# Patient Record
Sex: FEMALE | Race: BLACK OR AFRICAN AMERICAN | Employment: OTHER | URBAN - METROPOLITAN AREA
[De-identification: names, ages, dates, MRNs, and addresses within clinical notes are randomized per-mention and may not be internally consistent; named-entity substitution may affect disease eponyms.]

---

## 2018-11-15 ENCOUNTER — DOCUMENTATION ONLY (OUTPATIENT)
Dept: PULMONOLOGY | Age: 68
End: 2018-11-15

## 2018-11-15 NOTE — PROGRESS NOTES
PATIENT:       [facility] OF BIRTH:       1950  DATE:       11/14/2018 09:45 AM  VISIT TYPE:       Office Visit      Assessment/Plan  # Detail Type Description    1. Assessment Sleep apnea, unspecified (G47.30). Impression -Strong suspicion for sleep apnea as outlined above  -CO2 is 32 if positive home sleep study will benefit from dedicated CPAP/BiPAP titration as strong positivity of obesity hypoventilation syndrome    11/14.18: -came for a home sleep study follow-up still feels sleepy and tired  · Mild obstructive sleep apnea syndrome. · AHI of 7  · Lowest oxygen 69%, time below 88 for 1 hour and 10 minutes  · Overall good flow evaluation    RECOMMENDATIONS   Due to symptoms of sleep apnea, treatment is recommended. Patient has mild sleep apnea however flow evaluation was limited and degree of sleep apnea as underestimated. Patient also has severe hypoxemia. Patient will benefit from auto CPAP due to severe hypoxemia, other treatment options like dental appliance and weight loss can be considered also.  -discussed with the patient at length, patient is not sure whether she is willing to go for any CPAP treatment or other treatment at this point  -She would like to think over things and would like to come back  -She would like to come back after holidays we will schedule a 4 month follow-up  -Plan of care discussed at length. Patient Plan Obstructive Sleep Apnea (LIVIA). a.  The consequences of sleep apnea; cardiovascular disease (increase risk of stroke, heart disease, hypertension), cognitive difficulties (attention, memory, concentration difficulties), endocrine difficulties (insulin resistance and possible increase risk of diabetes) and vegetative symptoms (sleepiness, fatigue) were discussed with the patient. b.  The patient was warned that sleep apnea is associated with increase risk of motor vehicle accidents due to daytime sleepiness.      c.  The pathophysiology of sleep apnea and different treatment modalities (CPAP, surgery, dental appliance, weight reduction, positional therapy, medications) were discussed. 2. Assessment Essential (primary) hypertension (I10). Impression -Continue management per PCP  -Optimal treatment of LIVIA will help with better control. 3. Assessment Type 2 diabetes mellitus with hyperglycemia (E11.65). Impression -hemoglobin A1c 7.9 which might be continued into daytime sleepiness and fatigue  -Optimal management to PCP. 4. Assessment Advanced sleep phase syndrome (G47.22). Impression -Usually goes to bed around 8 weeks up at 230 in the morning  -Does not affect her day to day routine  -Does not wish to delay time. Assessment  -Evaluation for sleep apnea    Plan  0150Mild sleep apnea would like to think over treatment options and compatible follow-up    I reviewed patient's medication and adjusted the medication. All the questions related to patient's management plan discussed and answered to patient's satisfaction. I spent     minutes of my time, more than 50% of time spent on face-to-face evaluation, for diagnosis pathology and discussion of various treatment options patient understood the plan all questions were on start. Voice-recognition software may have been used to generate this report, which may have resulted in some phonetic-based errors in grammar and contents. Even though attempts were made to correct all the mistakes, some may have been missed, and remained in the body of the document. This 69year old female presents for HPI and Sleep Apnea (consult). History of Present Illness:  1.   HPI   10/15/2018:  -PCP Dr. Joseph Heredia  -Consult requested for sleep apnea evaluation/snoring  -Patient's primary complaint is snoring, feeling excessively fatigued and tired  -Usually goes to bed around 8 and wakes up around 233 o'clock, as per the patient she always wakes up early as she was working in starting at 5 a.m. in the past  -Does wake up a few times for no reason but overall she sleeps good  -Denies any history of COPD heart failure  -Denies any symptoms of parasomnia restless leg syndrome or narcolepsy  -Does have history of diabetes complicated by bilateral amputation  -Does go to low on [place] sleepiness score 2 out of 24 per does feel sleepy and tired throughout the day    Investigation: CO2 is 32, hemoglobin A1c 7.9    11/14.18: -came for a home sleep study follow-up still feels sleepy and tiredESS is anywhere from 9-11  · Mild obstructive sleep apnea syndrome.   · AHI of 7  · Lowest oxygen 69%, time below 88 for 1 hour and 10 minutes  · Overall good flow evaluation

## 2019-10-27 ENCOUNTER — HOSPITAL ENCOUNTER (OUTPATIENT)
Age: 69
LOS: 1 days | Discharge: HOME | End: 2019-10-29
Attending: INTERNAL MEDICINE | Admitting: INTERNAL MEDICINE
Payer: MEDICARE

## 2019-10-27 PROBLEM — D50.9 IRON DEFICIENCY ANEMIA: Status: ACTIVE | Noted: 2019-10-27

## 2019-10-27 LAB
ALBUMIN SERPL-MCNC: 2.9 G/DL (ref 3.4–5)
ALBUMIN/GLOB SERPL: 0.6 {RATIO} (ref 0.8–1.7)
ALP SERPL-CCNC: 101 U/L (ref 45–117)
ALT SERPL-CCNC: 35 U/L (ref 13–56)
ANION GAP SERPL CALC-SCNC: 5 MMOL/L (ref 3–18)
AST SERPL-CCNC: 24 U/L (ref 10–38)
BILIRUB SERPL-MCNC: 0.3 MG/DL (ref 0.2–1)
BUN SERPL-MCNC: 14 MG/DL (ref 7–18)
BUN/CREAT SERPL: 10 (ref 12–20)
CALCIUM SERPL-MCNC: 8.4 MG/DL (ref 8.5–10.1)
CHLORIDE SERPL-SCNC: 104 MMOL/L (ref 100–111)
CO2 SERPL-SCNC: 30 MMOL/L (ref 21–32)
CREAT SERPL-MCNC: 1.47 MG/DL (ref 0.6–1.3)
ERYTHROCYTE [DISTWIDTH] IN BLOOD BY AUTOMATED COUNT: 18.1 % (ref 11.6–14.5)
EST. AVERAGE GLUCOSE BLD GHB EST-MCNC: 212 MG/DL
GLOBULIN SER CALC-MCNC: 4.6 G/DL (ref 2–4)
GLUCOSE BLD STRIP.AUTO-MCNC: 102 MG/DL (ref 70–110)
GLUCOSE BLD STRIP.AUTO-MCNC: 169 MG/DL (ref 70–110)
GLUCOSE BLD STRIP.AUTO-MCNC: 201 MG/DL (ref 70–110)
GLUCOSE SERPL-MCNC: 262 MG/DL (ref 74–99)
HBA1C MFR BLD: 9 % (ref 4.2–5.6)
HCT VFR BLD AUTO: 37.6 % (ref 35–45)
HGB BLD-MCNC: 10.9 G/DL (ref 12–16)
MCH RBC QN AUTO: 22.4 PG (ref 24–34)
MCHC RBC AUTO-ENTMCNC: 29 G/DL (ref 31–37)
MCV RBC AUTO: 77.4 FL (ref 74–97)
PLATELET # BLD AUTO: 228 K/UL (ref 135–420)
PMV BLD AUTO: 10 FL (ref 9.2–11.8)
POTASSIUM SERPL-SCNC: 3.8 MMOL/L (ref 3.5–5.5)
PROT SERPL-MCNC: 7.5 G/DL (ref 6.4–8.2)
RBC # BLD AUTO: 4.86 M/UL (ref 4.2–5.3)
SODIUM SERPL-SCNC: 139 MMOL/L (ref 136–145)
WBC # BLD AUTO: 6.8 K/UL (ref 4.6–13.2)

## 2019-10-27 PROCEDURE — 36415 COLL VENOUS BLD VENIPUNCTURE: CPT

## 2019-10-27 PROCEDURE — 83540 ASSAY OF IRON: CPT

## 2019-10-27 PROCEDURE — 74011636637 HC RX REV CODE- 636/637: Performed by: INTERNAL MEDICINE

## 2019-10-27 PROCEDURE — 74011250637 HC RX REV CODE- 250/637: Performed by: INTERNAL MEDICINE

## 2019-10-27 PROCEDURE — 86677 HELICOBACTER PYLORI ANTIBODY: CPT

## 2019-10-27 PROCEDURE — 82962 GLUCOSE BLOOD TEST: CPT

## 2019-10-27 PROCEDURE — 83036 HEMOGLOBIN GLYCOSYLATED A1C: CPT

## 2019-10-27 PROCEDURE — 80053 COMPREHEN METABOLIC PANEL: CPT

## 2019-10-27 PROCEDURE — 85027 COMPLETE CBC AUTOMATED: CPT

## 2019-10-27 RX ORDER — ATROPINE SULFATE 0.1 MG/ML
0.5 INJECTION INTRAVENOUS
Status: CANCELLED | OUTPATIENT
Start: 2019-10-27 | End: 2019-10-28

## 2019-10-27 RX ORDER — MIDAZOLAM HYDROCHLORIDE 1 MG/ML
.25-5 INJECTION, SOLUTION INTRAMUSCULAR; INTRAVENOUS
Status: CANCELLED | OUTPATIENT
Start: 2019-10-27 | End: 2019-10-27

## 2019-10-27 RX ORDER — DULOXETIN HYDROCHLORIDE 60 MG/1
60 CAPSULE, DELAYED RELEASE ORAL DAILY
Status: DISCONTINUED | OUTPATIENT
Start: 2019-10-28 | End: 2019-10-29 | Stop reason: HOSPADM

## 2019-10-27 RX ORDER — SODIUM CHLORIDE 0.9 % (FLUSH) 0.9 %
5-40 SYRINGE (ML) INJECTION EVERY 8 HOURS
Status: CANCELLED | OUTPATIENT
Start: 2019-10-27

## 2019-10-27 RX ORDER — EPINEPHRINE 0.1 MG/ML
1 INJECTION INTRACARDIAC; INTRAVENOUS
Status: CANCELLED | OUTPATIENT
Start: 2019-10-27 | End: 2019-10-28

## 2019-10-27 RX ORDER — METOCLOPRAMIDE HYDROCHLORIDE 5 MG/ML
5 INJECTION INTRAMUSCULAR; INTRAVENOUS
Status: DISCONTINUED | OUTPATIENT
Start: 2019-10-27 | End: 2019-10-29 | Stop reason: HOSPADM

## 2019-10-27 RX ORDER — INSULIN LISPRO 100 [IU]/ML
INJECTION, SOLUTION INTRAVENOUS; SUBCUTANEOUS
Status: DISCONTINUED | OUTPATIENT
Start: 2019-10-27 | End: 2019-10-29 | Stop reason: HOSPADM

## 2019-10-27 RX ORDER — FLUMAZENIL 0.1 MG/ML
0.2 INJECTION INTRAVENOUS
Status: CANCELLED | OUTPATIENT
Start: 2019-10-27 | End: 2019-10-27

## 2019-10-27 RX ORDER — GABAPENTIN 400 MG/1
400 CAPSULE ORAL 3 TIMES DAILY
Status: DISCONTINUED | OUTPATIENT
Start: 2019-10-27 | End: 2019-10-29 | Stop reason: HOSPADM

## 2019-10-27 RX ORDER — SODIUM CHLORIDE 0.9 % (FLUSH) 0.9 %
5-40 SYRINGE (ML) INJECTION AS NEEDED
Status: CANCELLED | OUTPATIENT
Start: 2019-10-27

## 2019-10-27 RX ORDER — OLMESARTAN MEDOXOMIL 20 MG/1
40 TABLET ORAL DAILY
Status: DISCONTINUED | OUTPATIENT
Start: 2019-10-28 | End: 2019-10-29 | Stop reason: HOSPADM

## 2019-10-27 RX ORDER — AMLODIPINE BESYLATE 5 MG/1
10 TABLET ORAL DAILY
Status: DISCONTINUED | OUTPATIENT
Start: 2019-10-28 | End: 2019-10-29 | Stop reason: HOSPADM

## 2019-10-27 RX ORDER — SODIUM CHLORIDE 9 MG/ML
1000 INJECTION, SOLUTION INTRAVENOUS CONTINUOUS
Status: CANCELLED | OUTPATIENT
Start: 2019-10-27 | End: 2019-10-27

## 2019-10-27 RX ORDER — DIPHENHYDRAMINE HYDROCHLORIDE 50 MG/ML
50 INJECTION, SOLUTION INTRAMUSCULAR; INTRAVENOUS ONCE
Status: CANCELLED | OUTPATIENT
Start: 2019-10-27 | End: 2019-10-27

## 2019-10-27 RX ORDER — FENTANYL CITRATE 50 UG/ML
100 INJECTION, SOLUTION INTRAMUSCULAR; INTRAVENOUS
Status: CANCELLED | OUTPATIENT
Start: 2019-10-27 | End: 2019-10-27

## 2019-10-27 RX ORDER — NALOXONE HYDROCHLORIDE 0.4 MG/ML
0.4 INJECTION, SOLUTION INTRAMUSCULAR; INTRAVENOUS; SUBCUTANEOUS
Status: CANCELLED | OUTPATIENT
Start: 2019-10-27 | End: 2019-10-27

## 2019-10-27 RX ORDER — OLMESARTAN MEDOXOMIL 40 MG/1
40 TABLET ORAL DAILY
COMMUNITY

## 2019-10-27 RX ORDER — CARVEDILOL 12.5 MG/1
12.5 TABLET ORAL 2 TIMES DAILY WITH MEALS
Status: DISCONTINUED | OUTPATIENT
Start: 2019-10-27 | End: 2019-10-29 | Stop reason: HOSPADM

## 2019-10-27 RX ORDER — DEXTROMETHORPHAN/PSEUDOEPHED 2.5-7.5/.8
1.2 DROPS ORAL
Status: CANCELLED | OUTPATIENT
Start: 2019-10-27

## 2019-10-27 RX ADMIN — INSULIN LISPRO 2 UNITS: 100 INJECTION, SOLUTION INTRAVENOUS; SUBCUTANEOUS at 17:13

## 2019-10-27 RX ADMIN — GABAPENTIN 400 MG: 400 CAPSULE ORAL at 23:02

## 2019-10-27 RX ADMIN — GABAPENTIN 400 MG: 400 CAPSULE ORAL at 14:18

## 2019-10-27 RX ADMIN — CARVEDILOL 12.5 MG: 12.5 TABLET, FILM COATED ORAL at 17:13

## 2019-10-27 RX ADMIN — INSULIN LISPRO 4 UNITS: 100 INJECTION, SOLUTION INTRAVENOUS; SUBCUTANEOUS at 22:02

## 2019-10-27 NOTE — H&P
This 77year old female presents for Anemia. History of Present Illness:  1. 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     Carpal tunnel syndrome 2010 Y     Epicondylitis, lateral, right  N       PROBLEM LIST (not yet mapped to SNOMED-CT®):  Problem Description Onset Date Notes   AMPUTATION OF LOWER LIMB 2012    Traumatic amputation of leg(s) (complete) (partial), unilateral, below knee, without mention of complication               PAST MEDICAL/SURGICAL HISTORY   (Detailed)    Disease/disorder Onset Date Management Date Comments   Open angle glaucoma  Begin medication     Peripheral vascular disease 2010 BKA L and R     Diabetes mellitus, type 2 2010      High cholesterol       Hypertension         Cholecystectomy           Family History  (Detailed)  Relationship Family Member Name  Age at Death Condition Onset Age Cause of Death     N    N   Father  N  cancer 70 N         Social History:  (Detailed)  Tobacco use reviewed. The patient is right-handed. Preferred language is [place]. The patient does not need an . MARITAL STATUS/FAMILY/SOCIAL SUPPORT  Currently single. Tobacco use status: Ex-cigarette smoker. Smoking status: Former smoker.     SMOKING STATUS  Use Status Type Smoking Status Usage Per Day Years Used Total Pack Years   yes Cigarette Former smoker        CESSATION  Type Date Quit Longest Tobacco Free Cessation Method   Cigarette 1998       ALCOHOL  There is no history of alcohol use. CAFFEINE  The patient does not use caffeine. LIFESTYLE  Sedentary activity level. DIET  diabetic. Medications (active prior to today)  Medication Instructions Start Date Stop Date Refilled Elsewhere   tolterodine ER 4 mg capsule,extended release 24 hr take 1 capsule by oral route  every day 05/04/2018 05/04/2018 N   Norvasc 10 mg tablet TAKE ONE TABLET BY MOUTH ONCE DAILY 06/05/2018 06/05/2018 N   Crestor 20 mg tablet TAKE 1 TABLET DAILY 07/19/2018 07/19/2018 N   CARVEDILOL 12.5MG   TAB TAKE 1 TABLET BY MOUTH TWICE DAILY WITH  FOOD 10/18/2018  10/18/2018 N   GABAPENTIN 400MG    CAP TAKE ONE CAPSULE BY MOUTH THREE TIMES DAILY 10/31/2018  10/31/2018 N   Benicar 40 mg tablet take 1 tablet by oral route  every day 03/18/2019   N   Cymbalta 60 mg capsule,delayed release TAKE ONE CAPSULE BY MOUTH ONCE DAILY 04/11/2019 04/11/2019 N   Basaglar KwikPen U-100 Insulin 100 unit/mL (3 mL) subcutaneous inject 30 units by subcutaneous route at bedtime 04/16/2019   N   Novolog Flexpen U-100 Insulin aspart 100 unit/mL subcutaneous inject up to 30 units by subcutaneous route with meals 04/16/2019   N   BD Insulin Syringe Ultra-Fine 0.5 mL 31 gauge x 5/16\" Inject insulin subcutaneouly before each meal and bedtime 05/07/2019 05/07/2019 N   Pen Needle 31 gauge x 5/16\" USE ONE PEN NEEDLE TO CHECK BLOOD SUGAR FOUR TIMES A DAY. 05/07/2019 05/07/2019 N     Patient Status   Completed with information received for patient in a summary of care record. Medication Reconciliation  Medications reconciled today.   Medication Reviewed  Adherence Medication Name Sig Desc Elsewhere Status   taking as directed Cymbalta 60 mg capsule,delayed release TAKE ONE CAPSULE BY MOUTH ONCE DAILY N Verified   taking as directed Basaglar KwikPen U-100 Insulin 100 unit/mL (3 mL) subcutaneous inject 30 units by subcutaneous route at bedtime N Verified   taking as directed Norvasc 10 mg tablet TAKE ONE TABLET BY MOUTH ONCE DAILY N Verified   taking as directed tolterodine ER 4 mg capsule,extended release 24 hr take 1 capsule by oral route  every day N Verified   taking as directed CARVEDILOL 12.5MG   TAB TAKE 1 TABLET BY MOUTH TWICE DAILY WITH  FOOD N Verified   taking as directed Novolog Flexpen U-100 Insulin aspart 100 unit/mL subcutaneous inject up to 30 units by subcutaneous route with meals N Verified   taking as directed Crestor 20 mg tablet TAKE 1 TABLET DAILY N Verified   taking as directed GABAPENTIN 400MG    CAP TAKE ONE CAPSULE BY MOUTH THREE TIMES DAILY N Verified   taking as directed BD Insulin Syringe Ultra-Fine 0.5 mL 31 gauge x 5/16\" Inject insulin subcutaneouly before each meal and bedtime N Verified   taking as directed Pen Needle 31 gauge x 5/16\" USE ONE PEN NEEDLE TO CHECK BLOOD SUGAR FOUR TIMES A DAY.  N Verified   taking as directed Benicar 40 mg tablet take 1 tablet by oral route  every day N Verified     Medications (Added, Continued or Stopped today)  Start Date Medication Directions PRN Status PRN Reason Instruction Stop Date   04/16/2019 Basaglar KwikPen U-100 Insulin 100 unit/mL (3 mL) subcutaneous inject 30 units by subcutaneous route at bedtime N  E11.65    05/07/2019 BD Insulin Syringe Ultra-Fine 0.5 mL 31 gauge x 5/16\" Inject insulin subcutaneouly before each meal and bedtime N  E11.65    03/18/2019 Benicar 40 mg tablet take 1 tablet by oral route  every day N      10/18/2018 CARVEDILOL 12.5MG   TAB TAKE 1 TABLET BY MOUTH TWICE DAILY WITH  FOOD N      07/19/2018 Crestor 20 mg tablet TAKE 1 TABLET DAILY N      04/11/2019 Cymbalta 60 mg capsule,delayed release TAKE ONE CAPSULE BY MOUTH ONCE DAILY N      10/31/2018 GABAPENTIN 400MG    CAP TAKE ONE CAPSULE BY MOUTH THREE TIMES DAILY N      06/04/2019 GaviLyte-N 420 gram oral solution take as prescribed by physician N      06/04/2019 Iron (ferrous sulfate) 325 mg (65 mg iron) tablet take 1 tablet by oral route  every day N      06/05/2018 Norvasc 10 mg tablet TAKE ONE TABLET BY MOUTH ONCE DAILY N      04/16/2019 Novolog Flexpen U-100 Insulin aspart 100 unit/mL subcutaneous inject up to 30 units by subcutaneous route with meals N  E11.65    05/07/2019 Pen Needle 31 gauge x 5/16\" USE ONE PEN NEEDLE TO CHECK BLOOD SUGAR FOUR TIMES A DAY. N  E11.65   E11.9    05/04/2018 tolterodine ER 4 mg capsule,extended release 24 hr take 1 capsule by oral route  every day N        Allergies:  Ingredient Reaction (Severity) Medication Name Comment   NO KNOWN ALLERGIES          Review of Systems  System Neg/Pos Details   Constitutional Negative Chills, Fever, Malaise and Weight loss. ENMT Negative Nasal congestion and Sore throat. Eyes Negative Double vision. Respiratory Negative Asthma, Chronic cough and Wheezing. Cardio Negative Chest pain, Edema and Irregular heartbeat/palpitations. GI Negative Abdominal pain, Change in bowel habits, Constipation, Decreased appetite, Diarrhea, Dysphagia, Heartburn, Hematemesis, Hematochezia, Melena, Nausea, Reflux and Vomiting.  Negative Dysuria and Hematuria. Endocrine Negative Cold intolerance, Heat intolerance and Increased thirst.   Neuro Negative Dizziness, Headache, Numbness, Tremors and Vertigo. Psych Negative Anxiety, Depression and Increased stress. Integumentary Negative Hives and Rash. MS Negative Back pain, Joint pain and Myalgia. Hema/Lymph Negative Easy bleeding and Easy bruising. Allergic/Immuno Negative Food allergies and Seasonal allergies.    Vital Signs     Date/Time Temp Pulse BP MAP (Calculated) Arterial Line 1 BP (mmHg) BP Patient Position Resp SpO2 O2 Device O2 Flow Rate (L/min) Pre/Post Ductal Weight   10/27/19 1201 98.5 °F (36.9 °C) 82 141/55 84  At rest;Sitting 18 98 %       10/27/19 0940         Room air      10/27/19 0929 98.6 °F (37 °C) 97 153/73 100  At rest;Sitting 16 96 %           PHYSICAL EXAM:  Exam Findings Details   Constitutional Normal obese   Eyes Normal Conjunctiva - Right: Normal, Left: Normal. Sclera - Right: Normal, Left: Normal.   Nasopharynx Normal Lips/teeth/gums - Normal. Buccal mucosa - Normal. Many missing upper and back lower teeth   Neck Exam Normal Inspection - Normal. Palpation - Normal. Thyroid gland - Normal.   Respiratory Normal Inspection - Normal. Auscultation - Normal.   Cardiovascular Normal Regular rate and rhythm. No murmurs, gallops, or rubs. Vascular Normal    Abdomen Normal Inspection - obese protuberant  Appliance(s) with a medium sized umbilical hernia and a long oblique RUQ scar- Normal. Abdominal muscles - Normal. Auscultation - Normal. Percussion - Normal. Anterior palpation - Normal, No guarding. Umbilicus - Normal. No abdominal tenderness. No hepatic enlargement. No spleen enlargement. No hernia. No Ascites. Parikh's sign - Negative. No hepatic tenderness. No hepatic bruit. Skin Normal Inspection - Normal.   Musculoskeletal Comments Pt in a wheelchair, bilateral BKA. Musculoskeletal Normal Visual overview of all four extremities is normal. Gait - Normal. Cervical spine - Normal Inspection and Normal Range of Motion. Thoracic spine - Normal Inspection and Normal Range of Motion. Lumbar spine - Normal Inspection and Normal Range of Motion. Shoulder -Left: Normal Inspection and Normal Range of Motion, Right: Normal Inspection and Normal Range of Motion. Hands - Left: Normal Inspection and Normal Range of Motion, Right: Normal Inspection and Normal Range of Motion. Hands/Wrist - Right: Normal, Left: Normal. Knee - Left: Normal Inspection and Normal Range of Motion, Right: Normal Inspection and Normal Range of Motion. Extremity Normal No edema. She has bilateral below the knee amputation   Neurological Normal Fine motor skills - Normal. Moves all her extremities   Psychiatric Normal Orientation - Oriented to time, place, person & situation. Appropriate mood and affect. Assessment/Plan  # Detail Type Description    1. Assessment Anemia, unspecified (D64.9). Patient Plan 77year old female pt of Dr. Veronica Linn referred for very mild chronic iron deficiency anemia at 11.5 to 12 but with low iron present since at least 2010. No evidence of blood in the stool or dark stools. . No nausea, vomiting, SOB, abdominal pain, or chest pain. Pt reports some chills occasionally. H&H is 11.5 and 35.7 MCV 78. Iron is decreased at 48 and Iron sat is 13%. Pt denies any  dysphagia, early satiety, regurgitation, or GERD symptoms. No NSAID's. No prior hx of ulcer. She received blood transfusion during her leg amputation in 2011    PLAN: Oral iron supplement daily. EGD scheduled. Repeat CBC  and iron profile after EGD. Pt advised to drink prior to eating and in between bites of food, chew foods to soft consistency. Avoid NSAID's, tobacco and ETOH consumption. I explained the procedure of upper endoscopy or EGD, the alternative and the risks involved which include but not limited to aspiration, bleeding perforation or reaction to sedation. He was agreeable to this. 2. Assessment Encounter for screening colonoscopy (Z12.11). Patient Plan 77year old female  patient of Dr. Veronica Linn for colonoscopy. Last colonoscopy done on 06/18/2010  by my self medium sized hemorrhoids, slightly tender colon, and internal hemorrhoids. I don't think she ever had an EGD. Has on Upper or lower GI symptoms. She has one BM once daily. Normal color, soft, formed in consistency. No evidence of blood or mucus, changes in bowel pattern or constipation issues. Patient reports no allergies or herbal consumption. Medical hx includes HTN, DMII on Insuline, PVDNo significant cardiac, pulmonary, GI, , musculoskeletal, or endocrine issues. Surgical hx remarkable for cholecystectomy and bilateral below the knee amputation in 2010 and 2011. No family history of colorectal CA. Denies tobacco and ETOH use. No significant weight gains or losses in the last 3-6 months.  No heat or cold intolerances. Patient states  no N/V/D, fever, chills, sick contacts, SOB, abdominal or chest pains. No dysphagia, appetite is good which consists of 3 meals per day. PLAN: Colonoscopy Scheduled     She has average risk for colon cancer and is asymptomatic. She would be having her screening colonoscopy. I explained to her the procedure of colonoscopy and the risks involved which include but not limited to reaction to sedation, bleeding, perforation, infection or missing a lesion if bowels are not well prepped or are unusually tortuous. She agreed to proceed with the procedure and answered her questions. I gave her the Neil Copping to clean her bowels. I advised her to take if needed extra laxatives for few days before in case she is on the constipated side to assure adequate bowel prep. Told her not take her medications in the morning of the procedure because they would be flushed out with the prep but can take them more confidently after the procedure. I advised her to bring all her medication with her. I advised pt to not to take hypoglycemic agents/ Insuline the morning of the procedure to avoid hypoglycemic reaction because fasting is needed and check BS at least twice prior to procedure. Mild chronic iron deficiency anemia. Colonoscopy was negative in 6/ 2010 so for now we are going to do an EGD and colonoscopy at the same time because she has bilateral blow the knee amputation, lives at home with family and needs the hospital setting to do her bowel prep.  She has a large umbilical hernia

## 2019-10-27 NOTE — PROGRESS NOTES
0187 patient arrived on floor, direct admission. Patient alert and oriented x 4. She has bilateral BKA, able to transfer from her wheelchair to bed without assistance. Skin intact, pain rated 0/10. Missing some of her lower teeth, upper dentures left at home. Scar on back and L thigh. Skin otherwise intact. No complaint of pain/nausea. 36 Dr Abdias Villasenor was called, stated patient will be on clear liquid, resume all home meds, blood sugar checks ACHS, colonoscopy tomorrow unknown time, start half of her bowel prep now, the rest tomorrow morning. Patient is to use her own prep she brought to hospital. She is on clear liquid diet now, and is able to take her medications tonight and tomorrow AM per Dr Abdias Villasenor.     1700 patient started on colonoscopy prep. 1730 Large formed/soft BM. 1830 medium loose/watery BM. Dr Abdias Villasenor in to assess patient. Stated colonoscopy will likely be in the afternoon around [id number]. Night shift RN present and aware that patient will consume half of prep tonight and the rest tomorrow morning. Patient Vitals for the past 12 hrs:   Temp Pulse Resp BP SpO2   10/27/19 1940 97.9 °F (36.6 °C) 81 18 136/59 96 %   10/27/19 1201 98.5 °F (36.9 °C) 82 18 141/55 98 %   10/27/19 0929 98.6 °F (37 °C) 97 16 153/73 96 %     Bedside and Verbal shift change report given to Rafi Link RN (oncoming nurse) by Kacey Arboleda RN (offgoing nurse). Report included the following information SBAR, Kardex, Intake/Output, MAR and Recent Results.

## 2019-10-28 LAB
GLUCOSE BLD STRIP.AUTO-MCNC: 165 MG/DL (ref 70–110)
GLUCOSE BLD STRIP.AUTO-MCNC: 184 MG/DL (ref 70–110)
GLUCOSE BLD STRIP.AUTO-MCNC: 187 MG/DL (ref 70–110)
GLUCOSE BLD STRIP.AUTO-MCNC: 234 MG/DL (ref 70–110)
IRON SATN MFR SERPL: 12 %
IRON SERPL-MCNC: 31 UG/DL (ref 50–175)
TIBC SERPL-MCNC: 268 UG/DL (ref 250–450)

## 2019-10-28 PROCEDURE — 77030020256 HC SOL INJ NACL 0.9%  500ML: Performed by: INTERNAL MEDICINE

## 2019-10-28 PROCEDURE — G0500 MOD SEDAT ENDO SERVICE >5YRS: HCPCS | Performed by: INTERNAL MEDICINE

## 2019-10-28 PROCEDURE — 88305 TISSUE EXAM BY PATHOLOGIST: CPT

## 2019-10-28 PROCEDURE — 77030040361 HC SLV COMPR DVT MDII -B: Performed by: INTERNAL MEDICINE

## 2019-10-28 PROCEDURE — 76040000008: Performed by: INTERNAL MEDICINE

## 2019-10-28 PROCEDURE — 99153 MOD SED SAME PHYS/QHP EA: CPT | Performed by: INTERNAL MEDICINE

## 2019-10-28 PROCEDURE — 82962 GLUCOSE BLOOD TEST: CPT

## 2019-10-28 PROCEDURE — 77030013991 HC SNR POLYP ENDOSC BSC -A: Performed by: INTERNAL MEDICINE

## 2019-10-28 PROCEDURE — 74011636637 HC RX REV CODE- 636/637: Performed by: INTERNAL MEDICINE

## 2019-10-28 PROCEDURE — 77030016816 HC CATH ELECHEMSTAS1 BSC -C: Performed by: INTERNAL MEDICINE

## 2019-10-28 PROCEDURE — 77030010936 HC CLP LIG BSC -C: Performed by: INTERNAL MEDICINE

## 2019-10-28 PROCEDURE — 74011250637 HC RX REV CODE- 250/637: Performed by: INTERNAL MEDICINE

## 2019-10-28 PROCEDURE — 74011250636 HC RX REV CODE- 250/636: Performed by: INTERNAL MEDICINE

## 2019-10-28 RX ORDER — FENTANYL CITRATE 50 UG/ML
100 INJECTION, SOLUTION INTRAMUSCULAR; INTRAVENOUS
Status: DISCONTINUED | OUTPATIENT
Start: 2019-10-28 | End: 2019-10-28 | Stop reason: HOSPADM

## 2019-10-28 RX ORDER — NALOXONE HYDROCHLORIDE 0.4 MG/ML
0.4 INJECTION, SOLUTION INTRAMUSCULAR; INTRAVENOUS; SUBCUTANEOUS
Status: DISCONTINUED | OUTPATIENT
Start: 2019-10-28 | End: 2019-10-28 | Stop reason: HOSPADM

## 2019-10-28 RX ORDER — MIDAZOLAM HYDROCHLORIDE 1 MG/ML
.25-5 INJECTION, SOLUTION INTRAMUSCULAR; INTRAVENOUS
Status: DISCONTINUED | OUTPATIENT
Start: 2019-10-28 | End: 2019-10-28 | Stop reason: HOSPADM

## 2019-10-28 RX ORDER — FLUMAZENIL 0.1 MG/ML
0.2 INJECTION INTRAVENOUS
Status: DISCONTINUED | OUTPATIENT
Start: 2019-10-28 | End: 2019-10-28 | Stop reason: HOSPADM

## 2019-10-28 RX ORDER — SODIUM CHLORIDE 9 MG/ML
1000 INJECTION, SOLUTION INTRAVENOUS CONTINUOUS
Status: DISCONTINUED | OUTPATIENT
Start: 2019-10-28 | End: 2019-10-28 | Stop reason: HOSPADM

## 2019-10-28 RX ORDER — SODIUM CHLORIDE 9 MG/ML
1000 INJECTION, SOLUTION INTRAVENOUS CONTINUOUS
Status: DISPENSED | OUTPATIENT
Start: 2019-10-28 | End: 2019-10-28

## 2019-10-28 RX ORDER — ACETAMINOPHEN 500 MG
500 TABLET ORAL
Status: DISCONTINUED | OUTPATIENT
Start: 2019-10-28 | End: 2019-10-29 | Stop reason: HOSPADM

## 2019-10-28 RX ADMIN — DULOXETINE HYDROCHLORIDE 60 MG: 60 CAPSULE, DELAYED RELEASE ORAL at 08:35

## 2019-10-28 RX ADMIN — GABAPENTIN 400 MG: 400 CAPSULE ORAL at 21:44

## 2019-10-28 RX ADMIN — GABAPENTIN 400 MG: 400 CAPSULE ORAL at 08:35

## 2019-10-28 RX ADMIN — AMLODIPINE BESYLATE 10 MG: 5 TABLET ORAL at 08:34

## 2019-10-28 RX ADMIN — INSULIN LISPRO 2 UNITS: 100 INJECTION, SOLUTION INTRAVENOUS; SUBCUTANEOUS at 11:45

## 2019-10-28 RX ADMIN — INSULIN LISPRO 4 UNITS: 100 INJECTION, SOLUTION INTRAVENOUS; SUBCUTANEOUS at 21:51

## 2019-10-28 RX ADMIN — INSULIN LISPRO 2 UNITS: 100 INJECTION, SOLUTION INTRAVENOUS; SUBCUTANEOUS at 08:35

## 2019-10-28 RX ADMIN — INSULIN LISPRO 2 UNITS: 100 INJECTION, SOLUTION INTRAVENOUS; SUBCUTANEOUS at 17:40

## 2019-10-28 RX ADMIN — OLMESARTAN MEDOXOMIL 40 MG: 20 TABLET, FILM COATED ORAL at 08:35

## 2019-10-28 RX ADMIN — ACETAMINOPHEN 500 MG: 500 TABLET ORAL at 11:45

## 2019-10-28 RX ADMIN — CARVEDILOL 12.5 MG: 12.5 TABLET, FILM COATED ORAL at 08:34

## 2019-10-28 RX ADMIN — GABAPENTIN 400 MG: 400 CAPSULE ORAL at 17:41

## 2019-10-28 RX ADMIN — CARVEDILOL 12.5 MG: 12.5 TABLET, FILM COATED ORAL at 17:41

## 2019-10-28 NOTE — PROGRESS NOTES
SHIFT SUMMARY  Received patient alert and oriented,on room air,liquid diet and currently drinking Golytely in preparation for colonoscopy tomorrow afternoon. assessement completed per doc flow sheet,denies having any pain encouraged to continue drinking bowel prep having multiple loose  Brown stool.  at the bedside to explain the scheduled procedure. No changes noted,pt stable with V below. Patient Vitals for the past 4 hrs:   Temp Pulse Resp BP SpO2   10/27/19 2224 98 °F (36.7 °C) 80 18 116/55 94 %       Bedside and Verbal shift change report given to GRACIA Whiteside RN (oncoming nurse) by Karla Packer RN   (offgoing nurse). Report included the following information SBAR, Kardex and MAR.

## 2019-10-28 NOTE — PROGRESS NOTES
Problem: Falls - Risk of  Goal: *Absence of Falls  Description  Document Annabelle Guzman Fall Risk and appropriate interventions in the flowsheet.   Outcome: Progressing Towards Goal  Note:   Fall Risk Interventions:  Mobility Interventions: Patient to call before getting OOB              Elimination Interventions: Call light in reach

## 2019-10-28 NOTE — ROUTINE PROCESS
Bedside and Verbal shift change report given to Per Cabral RN (oncoming nurse) by Duke Yu RN 
 (offgoing nurse). Report included the following information SBAR, Kardex, Intake/Output, MAR and Recent Results.

## 2019-10-28 NOTE — PROCEDURES
[facility]  Esophagogastroduodenoscopy Procedure Report  _______________________________________________________  Patient: [facility]  Attending Physician: Karen Treadwell MD    Patient ID: [id number]                                     Referring Physician: Dr Nadege Bustillo      Exam Date: 10/28/2019   _______________________________________________________      Introduction: A 77 y.o. Female, presents for an Esophagogastroduodenoscopy Procedure    Indication:   pt of Dr. Nadege Bustillo referred for very mild chronic iron deficiency anemia at 11.5 to 12 but with low iron present since at least 2010. No evidence of blood in the stool or dark stools. . No nausea, vomiting, SOB, abdominal pain, or chest pain. Pt reports some chills occasionally. H&H is 11.5 and 35.7 MCV 78. Iron is decreased at 48 and Iron sat is 13%. Pt denies any  dysphagia, early satiety, regurgitation, or GERD symptoms. No NSAID's. No prior hx of ulcer. She received blood transfusion during her leg amputation in 2011    : Karen Treadwell MD    Sedation:    Versed 5 mg IV, fentanyl 100 mcg IV, glucagon . 5 mg IV    Procedure Details:  After infomed consent was obtained for the procedure, with all risks and benefits of procedure explained the patient was taken to the endoscopy suite and placed in the left lateral decubitus position. Following sequential administration of sedation as per above, the endoscope was inserted into the mouth and advanced under direct vision to fourth portion of the duodenum. A careful inspection was made as the gastroscope was withdrawn, including a retroflexed view of the proximal stomach; findings and interventions are described below. ASA status: III      FINDINGS:  HYPOPHARYNX AND LARYNX: Normal. Spastic criccopharynx  Esophagus: Normal proximal, middle and distal esophagus. Tiny < 1cm Hiatal hernia. Diaphragmatic pinch located at 42 cm. Stomach: No blood, food or liquid retention.  Normal, cardia, fundus, body, lesser curvature, incisura, antrum and pylorus. Mucosa is normal.    Duodenum:   The bulb, third, fourth portions and major papilla are normal. 2 tiny AVM lesions in the second portion of the duodenum one about > 1 cm above the major papilla and second in the distal D2 both cauterized by bipolar probe    Therapies:    2 small AVM  ablation performed with bipolar probe in the mid and distal portion of the duodenum    Specimen:   none           Complications:   None    EBL:  None    IMPRESSION:  Spastic cricco-pharynx. One tiny < 1 cm hiatal hernia. 2 tiny AVM lesions in the second portion of the duodenum one about > 1 cm above the major papilla and second in the distal D2 both cauterized by bipolar probe        RECOMMENDATIONS: -Follow clinical symptoms and laboratory studies for evidence of rebleeding. , -GERD diet: avoid fried and fatty foods.  peppermint, chocolate, alcohol, coffee, citrus fruits and juices, tomoato products; avoid lying down for 2 to 3 hours after eating., -Follow up with me., -No NSAID'S do the colonoscopy    Assistant: none    Obdulia Smith MD  10/28/2019  4:24 PM

## 2019-10-28 NOTE — PROGRESS NOTES
Shift summary: Pt is A/O x4, bilateral BKA, with wheelchair at bedside. Pt had started bowel prep prior to shift change last night for colonoscopy today - several watery stool but still dark to black. Pt still working on remainder of bowel prep this am. BS ACHS. On IVF of NS at 125 ml/hr started early am. Passed on in report that pt is NPO 8 hrs prior to procedure, consent not signed at this time.  Shift uneventful

## 2019-10-28 NOTE — PROCEDURES
[facility]  Colonoscopy Procedure Report  _______________________________________________________  Patient: JANIE GENERAL BIN [facility]                                         Attending Physician: Miroslava Luis MD    Patient ID: [id number]                                      Referring Physician: Dequan Chaves MD    Exam Date: October 28, 2019 _______________________________________________________      Introduction: A  77 y.o. female patient, presents for outpatient Colonoscopy    Indications: 77year old female  patient of Dr. Damion Bernal has hem positive chronic iron deficiency anemia. Last colonoscopy done on 06/18/2010  by my self medium sized hemorrhoids, slightly tender colon, and internal hemorrhoids. Has no Upper or lower GI symptoms. She has one BM once daily. Medical hx includes HTN, DMII on Insuline, PVD. CKD. Surgical hx remarkable for cholecystectomy and bilateral below the knee amputation in 2010 and 2011. No family history of colorectal     Consent: The benefits, risks, and alternatives to the procedure were discussed and informed consent was obtained from the patient. Preparation: EKG, pulse, pulse oximetry and blood pressure were monitored throughout the procedure. ASA Classification: Class 3 - . The heart is an S1-S2 and regular heart rate and rhythm. Lungs are clear to auscultation and percussion. Abdomen is soft, nondistended, and nontender. Mental Status: awake, alert, and oriented to person, place, and time    Medications:  Previously sedated   · Versed 5 mg IV throughout the procedure. · Glucagon . 5 mg IV     Rectal Exam: Anal skin tags. Normal Rectal Exam. No Blood. Pathology Specimens: one specimen removed. Procedure: The colonoscope was passed with difficulty through the anus under direct visualization and advanced to the cecum. The patient required positioning on the back and external counter pressure to aid in the passage of the scope from the ascending colon to the cecum because of looping. The scope was withdrawn and the mucosa was carefully examined. The quality of the preparation was fair. The views were excellent. The patient's toleration of the procedure was very good. Retroflexion was preformed in the ascending colon and hepatic flexure. Total time is 26 minutes and withdrawal time is 19 minutes. Findings:  Slightly long redundant colon. Rectum:   Normal  Sigmoid:   normal   Descending Colon:   Normal  Transverse Colon:   Normal  Ascending Colon:   6.5 mm sessile polyp in the proximal ascending colon, cold snared but then it bled. Bleeding not controlled with monopolar cautery I had to apply a metallic clip to control it  Cecum:   Hard to reach  Terminal Ileum:   Not entered      Unplanned Events: There were no unplanned events. Estimated Blood Loss: Minimal  Impressions:    Slightly long redundant colon. 6.5 mm sessile polyp in the proximal ascending colon, cold snared but then it bled. Bleeding not controlled with monopolar cautery I had to apply a metallic clip to control it. Normal Mucosa. No diverticula found. Complications: None; patient tolerated the procedure well. Recommendations:  · Discharge home when standard parameters are met. · Resume a high fiber diabetic diet. · Colonoscopy recommendation in 5 years. · Can go home today or tomorrow am  · Monitor Hgb /Hct . Consider capsule enteroscopy.  If repeat occult blood in the stool is positive again and or the Hgb keeps dropping further    Procedure Codes:    · Roya Lowe [[id number]]  · Micah Kimball BLEEDING [[id number]]    Endoscope Information:  Model Number(s)    [id number]   Assistant: None    Signed By: David Walsh MD Date: October 28, 2019

## 2019-10-28 NOTE — PROGRESS NOTES
5396 -  Bedside and verbal shift change report given to Vanessa Diaz RN (on coming nurse) by Rajinder Briceno RN (off going nurse). Report included the following information SBAR, Kardex, OR Summary, Intake/Output and MAR.    1925 -  Bedside and verbal shift change report given by JESSENIA Mejias (off going nurse) to SASCHA Vanessa RN (on coming nurse). Report included the following information SBAR, Kardex, OR Summary, Intake/Output and MAR.

## 2019-10-28 NOTE — PROGRESS NOTES
Reason for Admission:   Planned EGD and colonoscopy                    RRAT Score:    Low; 5                 Plan for utilizing home health:  TBD                        Current Advanced Directive/Advance Care Plan:  Not on file                         Transition of Care Plan:      Physician follow up vs [facility] and physician follow up 10/29/19                Chart reviewed. CM met with pt at bedside to discuss transition of care. Pt's adult daughter lives with pt and will assist as needed. Pt is independent in her wheel chair and plans to return home following following EGD and colonoscopy today. Anticipate pt will transition home with in then next 24 hours with physician follow up. Pt's daughter will transport pt home when medically stable. CM to continue to follow and assist as needed. Care Management Interventions  Mode of Transport at Discharge:  Other (see comment)  Transition of Care Consult (CM Consult): Discharge Planning  Health Maintenance Reviewed: Yes  Current Support Network: Family Lives Nearby, Other(pt's adult daughter lives with pt)  Plan discussed with Pt/Family/Caregiver: Yes  Discharge Location  Discharge Placement: Home with family assistance

## 2019-10-28 NOTE — PROGRESS NOTES
Problem: Falls - Risk of  Goal: *Absence of Falls  Description  Document Ally Read Fall Risk and appropriate interventions in the flowsheet.   10/28/2019 1950 by Solange Berrios RN  Outcome: Progressing Towards Goal  Note:   Fall Risk Interventions:  Mobility Interventions: Patient to call before getting OOB              Elimination Interventions: Call light in reach           10/28/2019 1758 by Solange Berrios RN  Outcome: Progressing Towards Goal  Note:   Fall Risk Interventions:  Mobility Interventions: Patient to call before getting OOB              Elimination Interventions: Call light in reach

## 2019-10-29 VITALS
RESPIRATION RATE: 18 BRPM | SYSTOLIC BLOOD PRESSURE: 118 MMHG | WEIGHT: 255.1 LBS | DIASTOLIC BLOOD PRESSURE: 57 MMHG | HEIGHT: 68 IN | OXYGEN SATURATION: 95 % | BODY MASS INDEX: 38.66 KG/M2 | HEART RATE: 86 BPM | TEMPERATURE: 98.6 F

## 2019-10-29 LAB
GLUCOSE BLD STRIP.AUTO-MCNC: 130 MG/DL (ref 70–110)
GLUCOSE BLD STRIP.AUTO-MCNC: 172 MG/DL (ref 70–110)
H PYLORI IGA SER-ACNC: 11.9 UNITS (ref 0–8.9)
H PYLORI IGG SER IA-ACNC: 0.51 INDEX VALUE (ref 0–0.79)

## 2019-10-29 PROCEDURE — 74011636637 HC RX REV CODE- 636/637: Performed by: INTERNAL MEDICINE

## 2019-10-29 PROCEDURE — 74011250637 HC RX REV CODE- 250/637: Performed by: INTERNAL MEDICINE

## 2019-10-29 PROCEDURE — 82962 GLUCOSE BLOOD TEST: CPT

## 2019-10-29 RX ADMIN — INSULIN LISPRO 2 UNITS: 100 INJECTION, SOLUTION INTRAVENOUS; SUBCUTANEOUS at 12:36

## 2019-10-29 RX ADMIN — OLMESARTAN MEDOXOMIL 40 MG: 20 TABLET, FILM COATED ORAL at 09:50

## 2019-10-29 RX ADMIN — CARVEDILOL 12.5 MG: 12.5 TABLET, FILM COATED ORAL at 09:49

## 2019-10-29 RX ADMIN — DULOXETINE HYDROCHLORIDE 60 MG: 60 CAPSULE, DELAYED RELEASE ORAL at 09:50

## 2019-10-29 RX ADMIN — AMLODIPINE BESYLATE 10 MG: 5 TABLET ORAL at 09:49

## 2019-10-29 RX ADMIN — ACETAMINOPHEN 500 MG: 500 TABLET ORAL at 12:38

## 2019-10-29 RX ADMIN — GABAPENTIN 400 MG: 400 CAPSULE ORAL at 09:50

## 2019-10-29 NOTE — PROGRESS NOTES
Problem: Falls - Risk of  Goal: *Absence of Falls  Description  Document Allyjarret Read Fall Risk and appropriate interventions in the flowsheet.   Outcome: Progressing Towards Goal  Note:   Fall Risk Interventions:  Mobility Interventions: Utilize walker, cane, or other assistive device, Patient to call before getting OOB              Elimination Interventions: Call light in reach, Stay With Me (per policy), Patient to call for help with toileting needs

## 2019-10-29 NOTE — PROGRESS NOTES
Discharge instructions reviewed with the patient. Patient verbalized understanding and verified by teach back. All questions answered. IV discontinued, no redness, swelling or pain noted. Patient awaiting family for transportation home, with an ETA of 30min. Patient discharged off the unit via wheelchair.  Patient armband removed and shredded

## 2019-10-29 NOTE — DISCHARGE INSTRUCTIONS
Patient Education        Iron Deficiency Anemia: Care Instructions  Your Care Instructions    Anemia means that you do not have enough red blood cells. Red blood cells carry oxygen around your body. When you have anemia, it can make you pale, weak, and tired. Many things can cause anemia. The most common cause is loss of blood. This can happen if you have heavy menstrual periods. It can also happen if you have bleeding in your stomach or bowel. You can also get anemia if you don't have enough iron in your diet or if it's hard for your body to absorb iron. In some cases, pregnancy causes anemia. That's because a pregnant woman needs more iron. Your doctor may do more tests to find the cause of your anemia. If a disease or other health problem is causing it, your doctor will treat that problem. It's important to follow up with your doctor to make sure that your iron level returns to normal.  Follow-up care is a key part of your treatment and safety. Be sure to make and go to all appointments, and call your doctor if you are having problems. It's also a good idea to know your test results and keep a list of the medicines you take. How can you care for yourself at home? · If your doctor recommended iron pills, take them as directed. ? Try to take the pills on an empty stomach. You can do this about 1 hour before or 2 hours after meals. But you may need to take iron with food to avoid an upset stomach. ? Do not take antacids or drink milk or anything with caffeine within 2 hours of when you take your iron. They can keep your body from absorbing the iron well. ? Vitamin C helps your body absorb iron. You may want to take iron pills with a glass of orange juice or some other food high in vitamin C.  ? Iron pills may cause stomach problems. These include heartburn, nausea, diarrhea, constipation, and cramps. It can help to drink plenty of fluids and include fruits, vegetables, and fiber in your diet.   ? It's normal for iron pills to make your stool a greenish or grayish black. But internal bleeding can also cause dark stool. So it's important to tell your doctor about any color changes. ? Call your doctor if you think you are having a problem with your iron pills. Even after you start to feel better, it will take several months for your body to build up its supply of iron. ? If you miss a pill, don't take a double dose. ? Keep iron pills out of the reach of small children. Too much iron can be very dangerous. · Eat foods with a lot of iron. These include red meat, shellfish, poultry, and eggs. They also include beans, raisins, whole-grain bread, and leafy green vegetables. · Steam your vegetables. This is the best way to prepare them if you want to get as much iron as possible. · Be safe with medicines. Do not take nonsteroidal anti-inflammatory pain relievers unless your doctor tells you to. These include aspirin, naproxen (Aleve), and ibuprofen (Advil, Motrin). · Liquid iron can stain your teeth. But you can mix it with water or juice and drink it with a straw. Then it won't get on your teeth. When should you call for help? Call 911 anytime you think you may need emergency care. For example, call if:    · You passed out (lost consciousness).    Call your doctor now or seek immediate medical care if:    · You are short of breath.     · You are dizzy or light-headed, or you feel like you may faint.     · You have new or worse bleeding.    Watch closely for changes in your health, and be sure to contact your doctor if:    · You feel weaker or more tired than usual.     · You do not get better as expected. Where can you learn more? Go to http://greta-amanda.info/. Enter M659 in the search box to learn more about \"Iron Deficiency Anemia: Care Instructions. \"  Current as of: March 28, 2019  Content Version: 12.2  © 8747-1900 [employer].  Care instructions adapted under license by [facility] (which disclaims liability or warranty for this information). If you have questions about a medical condition or this instruction, always ask your healthcare professional. [address] any warranty or liability for your use of this information. DISCHARGE SUMMARY from Nurse    PATIENT INSTRUCTIONS:    After general anesthesia or intravenous sedation, for 24 hours or while taking prescription Narcotics:  · Limit your activities  · Do not drive and operate hazardous machinery  · Do not make important personal or business decisions  · Do  not drink alcoholic beverages  · If you have not urinated within 8 hours after discharge, please contact your surgeon on call. Report the following to your surgeon:  · Excessive pain, swelling, redness or odor of or around the surgical area  · Temperature over 100.5  · Nausea and vomiting lasting longer than 4 hours or if unable to take medications  · Any signs of decreased circulation or nerve impairment to extremity: change in color, persistent  numbness, tingling, coldness or increase pain  · Any questions    What to do at Home:  Recommended activity: Activity as tolerated,     *  Please give a list of your current medications to your Primary Care Provider. *  Please update this list whenever your medications are discontinued, doses are      changed, or new medications (including over-the-counter products) are added. *  Please carry medication information at all times in case of emergency situations. These are general instructions for a healthy lifestyle:    No smoking/ No tobacco products/ Avoid exposure to second hand smoke  Surgeon General's Warning:  Quitting smoking now greatly reduces serious risk to your health.     Obesity, smoking, and sedentary lifestyle greatly increases your risk for illness    A healthy diet, regular physical exercise & weight monitoring are important for maintaining a healthy lifestyle    You may be retaining fluid if you have a history of heart failure or if you experience any of the following symptoms:  Weight gain of 3 pounds or more overnight or 5 pounds in a week, increased swelling in our hands or feet or shortness of breath while lying flat in bed. Please call your doctor as soon as you notice any of these symptoms; do not wait until your next office visit. The discharge information has been reviewed with the patient. The patient verbalized understanding. Discharge medications reviewed with the patient and appropriate educational materials and side effects teaching were provided.   ___________________________________________________________________________________________________________________________________

## 2019-10-29 NOTE — PROGRESS NOTES
2026 - Assessment completed at this time. Pt denies any chest pain or SOB. Pt denies and numbness or tingling to extremities. Pt encourage to call for assistance. Pt left in bed with no signs of distress, bed in the low position, wheels locked, and call light within reach. Shift summary - Pt had an uneventful night. Pt did not complain of any pain . Pt encouraged to call for assistance. Pt left in with no signs of distress.

## 2019-10-29 NOTE — ROUTINE PROCESS
Bedside and Verbal shift change report given to Ardith Dakin, RN (oncoming nurse) by SASCHA Walsh RN (offgoing nurse). Report included the following information SBAR, Kardex, Intake/Output, MAR and Recent Results.

## 2019-10-29 NOTE — PROGRESS NOTES
Problem: Falls - Risk of  Goal: *Absence of Falls  Description  Document Vikas Pereyra Fall Risk and appropriate interventions in the flowsheet. Outcome: Resolved/Met  Note:   Fall Risk Interventions:  Mobility Interventions: Utilize walker, cane, or other assistive device, Patient to call before getting OOB              Elimination Interventions: Call light in reach, Stay With Me (per policy), Patient to call for help with toileting needs              Problem: Patient Education: Go to Patient Education Activity  Goal: Patient/Family Education  Outcome: Resolved/Met     Problem: Diabetes Self-Management  Goal: *Disease process and treatment process  Description  Define diabetes and identify own type of diabetes; list 3 options for treating diabetes. Outcome: Resolved/Met  Goal: *Incorporating nutritional management into lifestyle  Description  Describe effect of type, amount and timing of food on blood glucose; list 3 methods for planning meals. Outcome: Resolved/Met  Goal: *Incorporating physical activity into lifestyle  Description  State effect of exercise on blood glucose levels. Outcome: Resolved/Met  Goal: *Developing strategies to promote health/change behavior  Description  Define the ABC's of diabetes; identify appropriate screenings, schedule and personal plan for screenings. Outcome: Resolved/Met  Goal: *Using medications safely  Description  State effect of diabetes medications on diabetes; name diabetes medication taking, action and side effects. Outcome: Resolved/Met  Goal: *Monitoring blood glucose, interpreting and using results  Description  Identify recommended blood glucose targets  and personal targets. Outcome: Resolved/Met  Goal: *Prevention, detection, treatment of acute complications  Description  List symptoms of hyper- and hypoglycemia; describe how to treat low blood sugar and actions for lowering  high blood glucose level.   Outcome: Resolved/Met  Goal: *Prevention, detection and treatment of chronic complications  Description  Define the natural course of diabetes and describe the relationship of blood glucose levels to long term complications of diabetes. Outcome: Resolved/Met  Goal: *Developing strategies to address psychosocial issues  Description  Describe feelings about living with diabetes; identify support needed and support network  Outcome: Resolved/Met  Goal: *Insulin pump training  Outcome: Resolved/Met  Goal: *Sick day guidelines  Outcome: Resolved/Met  Goal: *Patient Specific Goal (EDIT GOAL, INSERT TEXT)  Outcome: Resolved/Met     Problem: Patient Education: Go to Patient Education Activity  Goal: Patient/Family Education  Outcome: Resolved/Met     Problem: Pressure Injury - Risk of  Goal: *Prevention of pressure injury  Description  Document Nawaf Scale and appropriate interventions in the flowsheet.   Outcome: Resolved/Met  Note:   Pressure Injury Interventions:  Sensory Interventions: Assess changes in LOC    Moisture Interventions: Absorbent underpads    Activity Interventions: Increase time out of bed, Pressure redistribution bed/mattress(bed type)    Mobility Interventions: Pressure redistribution bed/mattress (bed type)    Nutrition Interventions: Document food/fluid/supplement intake    Friction and Shear Interventions: Minimize layers                Problem: Patient Education: Go to Patient Education Activity  Goal: Patient/Family Education  Outcome: Resolved/Met

## 2019-10-29 NOTE — PROGRESS NOTES
Bedside shift change report given to Karo Shukla RN (oncoming nurse) by Desirae Morrissey RN (offgoing nurse). Report included the following information SBAR, Kardex, ED Summary, Procedure Summary, Intake/Output, MAR and Recent Results.

## 2019-10-29 NOTE — DIABETES MGMT
GLYCEMIC CONTROL PROGRESS NOTE:    - known h/o T2DM, HbA1C not within recommended range for age + comorbids on TID SSI home regimen  - BG out of target range non-ICU: < 180 mg/dL  - TDD = 10 units - Humalog Normal Insulin Sensitivity Corrective Coverage  - recommend monitor BG trends pt may benefit from mealtime insulin   *Humalog 3 units qac  - pt on consistent carb diet  Recent Glucose Results:   Lab Results   Component Value Date/Time    GLUCPOC 130 (H) 10/29/2019 07:20 AM    GLUCPOC 234 (H) 10/28/2019 09:47 PM    GLUCPOC 165 (H) 10/28/2019 05:38 PM     Willian Torrez MS, RN, CDE  Glycemic Control Team  [phone]  Pager [phone] (M-TH 8:00-4:30P)  *After Hours pager [phone]

## 2019-10-29 NOTE — DIABETES MGMT
Diabetes Patient/Family Education Record  Factors That  May Influence Patients Ability  to Learn or  Comply with Recommendations   []   Language barrier    []   Cultural needs   []   Motivation    []   Cognitive limitation    []   Physical   []   Education    []   Physiological factors   []   Hearing/vision/speaking impairment   []   Presybeterian beliefs    []   Financial factors   []  Other:   [x]  No factors identified at this time.      Person Instructed:   [x]   Patient   []   Family   []  Other     Preference for Learning:   [x]   Verbal   []   Written   []  Demonstration     Level of Comprehension & Competence:    [x]  Good                                      [] Fair                                     []  Poor                             []  Needs Reinforcement   [x]  Teachback completed    Education Component: in depth diet education, pt provided with ADA meal planning guide   [x]  Medication management, including confirmation of home regimen    [x]  Nutritional management -obtain usual meal pattern; impact portion sizes and carbs have on GC   []  Exercise   []  Signs, symptoms, and treatment of hyperglycemia and hypoglycemia   [x] Prevention, recognition and treatment of hyperglycemia and hypoglycemia; rarely hypoglycemic at home   [x]  Importance of blood glucose monitoring     []  Instruction on use of the blood glucose meter   [x]  Discuss the importance of HbA1C monitoring    []  Sick day guidelines   []  Proper use and disposal of lancets, needles, syringes or insulin pens (if appropriate)   []  Potential long-term complications (retinopathy, kidney disease, neuropathy, foot care)   [] Information about whom to contact in case of emergency or for more information    [x]  Goal:  Patient/family will demonstrate understanding of Diabetes Self Management Skills by: (date) ___12/31____  Plan for post-discharge education or self-management support:    [] Outpatient class schedule provided            [] Patient Declined    [] Scheduled for outpatient classes (date) _______  Verify:  Does patient understand how diabetes medications work? __________yes__________________  Does patient know what their most recent A1c is? ___________________yes________________  Does patient monitor glucose at home? ______________yes_____________________________  Does patient have difficulty obtaining diabetes medications or testing supplies? __________no_______     Mayra Reese MS, RN, CDE  Glycemic Control Team  [phone]  Pager [phone] (M-TH 8:00-4:30P)  *After Hours pager [phone]

## 2022-03-19 PROBLEM — D50.9 IRON DEFICIENCY ANEMIA: Status: ACTIVE | Noted: 2019-10-27

## 2024-06-26 ENCOUNTER — APPOINTMENT (OUTPATIENT)
Facility: HOSPITAL | Age: 74
End: 2024-06-26
Payer: COMMERCIAL

## 2024-06-26 ENCOUNTER — HOSPITAL ENCOUNTER (EMERGENCY)
Facility: HOSPITAL | Age: 74
Discharge: HOME | End: 2024-06-26
Attending: STUDENT IN AN ORGANIZED HEALTH CARE EDUCATION/TRAINING PROGRAM
Payer: COMMERCIAL

## 2024-06-26 VITALS
SYSTOLIC BLOOD PRESSURE: 140 MMHG | TEMPERATURE: 98.3 F | WEIGHT: 144 LBS | RESPIRATION RATE: 18 BRPM | HEART RATE: 75 BPM | DIASTOLIC BLOOD PRESSURE: 66 MMHG | OXYGEN SATURATION: 95 %

## 2024-06-26 DIAGNOSIS — R55 SYNCOPE AND COLLAPSE: ICD-10-CM

## 2024-06-26 DIAGNOSIS — E16.2 HYPOGLYCEMIA: ICD-10-CM

## 2024-06-26 DIAGNOSIS — E86.0 DEHYDRATION: ICD-10-CM

## 2024-06-26 DIAGNOSIS — R41.82 ALTERED MENTAL STATUS, UNSPECIFIED ALTERED MENTAL STATUS TYPE: Primary | ICD-10-CM

## 2024-06-26 LAB
ALBUMIN SERPL-MCNC: 2.9 G/DL (ref 3.4–5)
ALBUMIN/GLOB SERPL: 0.7 (ref 0.8–1.7)
ALP SERPL-CCNC: 66 U/L (ref 45–117)
ALT SERPL-CCNC: 17 U/L (ref 13–56)
ANION GAP SERPL CALC-SCNC: 5 MMOL/L (ref 3–18)
AST SERPL-CCNC: 21 U/L (ref 10–38)
BASOPHILS # BLD: 0 K/UL (ref 0–0.1)
BASOPHILS NFR BLD: 0 % (ref 0–2)
BILIRUB SERPL-MCNC: 0.2 MG/DL (ref 0.2–1)
BUN SERPL-MCNC: 23 MG/DL (ref 7–18)
BUN/CREAT SERPL: 14 (ref 12–20)
CALCIUM SERPL-MCNC: 8.9 MG/DL (ref 8.5–10.1)
CHLORIDE SERPL-SCNC: 110 MMOL/L (ref 100–111)
CO2 SERPL-SCNC: 26 MMOL/L (ref 21–32)
CREAT SERPL-MCNC: 1.6 MG/DL (ref 0.6–1.3)
DIFFERENTIAL METHOD BLD: ABNORMAL
EOSINOPHIL # BLD: 0.3 K/UL (ref 0–0.4)
EOSINOPHIL NFR BLD: 3 % (ref 0–5)
ERYTHROCYTE [DISTWIDTH] IN BLOOD BY AUTOMATED COUNT: 18.1 % (ref 11.6–14.5)
ETHANOL SERPL-MCNC: <3 MG/DL (ref 0–3)
GLOBULIN SER CALC-MCNC: 4.2 G/DL (ref 2–4)
GLUCOSE BLD STRIP.AUTO-MCNC: 181 MG/DL (ref 70–110)
GLUCOSE BLD STRIP.AUTO-MCNC: 215 MG/DL (ref 70–110)
GLUCOSE BLD STRIP.AUTO-MCNC: 82 MG/DL (ref 70–110)
GLUCOSE SERPL-MCNC: 48 MG/DL (ref 74–99)
HCT VFR BLD AUTO: 39 % (ref 35–45)
HGB BLD-MCNC: 11.5 G/DL (ref 12–16)
IMM GRANULOCYTES # BLD AUTO: 0 K/UL (ref 0–0.04)
IMM GRANULOCYTES NFR BLD AUTO: 1 % (ref 0–0.5)
LIPASE SERPL-CCNC: 17 U/L (ref 13–75)
LYMPHOCYTES # BLD: 2.8 K/UL (ref 0.9–3.6)
LYMPHOCYTES NFR BLD: 37 % (ref 21–52)
MAGNESIUM SERPL-MCNC: 2.1 MG/DL (ref 1.6–2.6)
MCH RBC QN AUTO: 23.1 PG (ref 24–34)
MCHC RBC AUTO-ENTMCNC: 29.5 G/DL (ref 31–37)
MCV RBC AUTO: 78.5 FL (ref 78–100)
MONOCYTES # BLD: 0.6 K/UL (ref 0.05–1.2)
MONOCYTES NFR BLD: 8 % (ref 3–10)
NEUTS SEG # BLD: 3.8 K/UL (ref 1.8–8)
NEUTS SEG NFR BLD: 51 % (ref 40–73)
NRBC # BLD: 0 K/UL (ref 0–0.01)
NRBC BLD-RTO: 0 PER 100 WBC
PLATELET # BLD AUTO: 212 K/UL (ref 135–420)
PMV BLD AUTO: 10.1 FL (ref 9.2–11.8)
POTASSIUM SERPL-SCNC: 3.7 MMOL/L (ref 3.5–5.5)
PROT SERPL-MCNC: 7.1 G/DL (ref 6.4–8.2)
RBC # BLD AUTO: 4.97 M/UL (ref 4.2–5.3)
SODIUM SERPL-SCNC: 141 MMOL/L (ref 136–145)
TROPONIN I SERPL HS-MCNC: 10 NG/L (ref 0–54)
WBC # BLD AUTO: 7.5 K/UL (ref 4.6–13.2)

## 2024-06-26 PROCEDURE — 74177 CT ABD & PELVIS W/CONTRAST: CPT

## 2024-06-26 PROCEDURE — 99285 EMERGENCY DEPT VISIT HI MDM: CPT

## 2024-06-26 PROCEDURE — 84484 ASSAY OF TROPONIN QUANT: CPT

## 2024-06-26 PROCEDURE — 96360 HYDRATION IV INFUSION INIT: CPT

## 2024-06-26 PROCEDURE — 83690 ASSAY OF LIPASE: CPT

## 2024-06-26 PROCEDURE — 85025 COMPLETE CBC W/AUTO DIFF WBC: CPT

## 2024-06-26 PROCEDURE — 80053 COMPREHEN METABOLIC PANEL: CPT

## 2024-06-26 PROCEDURE — 71045 X-RAY EXAM CHEST 1 VIEW: CPT

## 2024-06-26 PROCEDURE — 82962 GLUCOSE BLOOD TEST: CPT

## 2024-06-26 PROCEDURE — 70450 CT HEAD/BRAIN W/O DYE: CPT

## 2024-06-26 PROCEDURE — 6360000004 HC RX CONTRAST MEDICATION: Performed by: STUDENT IN AN ORGANIZED HEALTH CARE EDUCATION/TRAINING PROGRAM

## 2024-06-26 PROCEDURE — 96361 HYDRATE IV INFUSION ADD-ON: CPT

## 2024-06-26 PROCEDURE — 83735 ASSAY OF MAGNESIUM: CPT

## 2024-06-26 PROCEDURE — 82077 ASSAY SPEC XCP UR&BREATH IA: CPT

## 2024-06-26 PROCEDURE — 2580000003 HC RX 258: Performed by: STUDENT IN AN ORGANIZED HEALTH CARE EDUCATION/TRAINING PROGRAM

## 2024-06-26 RX ORDER — ONDANSETRON 2 MG/ML
4 INJECTION INTRAMUSCULAR; INTRAVENOUS ONCE
Status: DISCONTINUED | OUTPATIENT
Start: 2024-06-26 | End: 2024-06-26 | Stop reason: HOSPADM

## 2024-06-26 RX ORDER — 0.9 % SODIUM CHLORIDE 0.9 %
1000 INTRAVENOUS SOLUTION INTRAVENOUS ONCE
Status: COMPLETED | OUTPATIENT
Start: 2024-06-26 | End: 2024-06-26

## 2024-06-26 RX ADMIN — SODIUM CHLORIDE 1000 ML: 900 INJECTION, SOLUTION INTRAVENOUS at 10:31

## 2024-06-26 RX ADMIN — DEXTROSE MONOHYDRATE 250 ML: 100 INJECTION, SOLUTION INTRAVENOUS at 11:47

## 2024-06-26 RX ADMIN — IOPAMIDOL 100 ML: 612 INJECTION, SOLUTION INTRAVENOUS at 13:49

## 2024-06-26 NOTE — ED TRIAGE NOTES
Pt arrives via EMS for c/o AMS. Pt was Found at [facility] in bathroom in wheelchair. Pt is a double BKA.  EMS has no medical hx. EMS report that on their arrival pt was minimally responsive to voice and mostly responsive to pain. EMS report  clammy to touch. BS- 74, BP- 98/60 P-70 with 1st degree block   O2-92% RA, EMS placed on NC at 2lpm came up to 96%. EMS placed 20g Left Ac gave fluids. On arrival pt is A&Ox3, is covered in feces. EMS report pts wheelchair is infested with cockroaches and bed bugs. Wheelchair left outside in ED bay.

## 2024-06-26 NOTE — ED NOTES
With assistance of AISHA Toney full linen change with incot care was provided. Brief placed and pt left lying on stretcher, HOB elevated and bed in lowest position, call bell within reach, no distress noted.

## 2024-06-26 NOTE — DISCHARGE INSTRUCTIONS
Your blood sugar was 48 here in the ER.  You have been given IV and oral dextrose to help improve your blood sugar.  Please check your blood sugar 3 times daily and eating regular meals to help keep your blood sugar in a normal range.  Please follow-up with your doctor in regards to her insulin regimen.

## 2024-06-26 NOTE — ED PROVIDER NOTES
[facility] EMERGENCY DEPT  EMERGENCY DEPARTMENT ENCOUNTER       Pt Name: Gwen Jorgensen  MRN: [id number]  Birthdate 1950  Date of evaluation: 6/26/2024  Provider: Ovidio Moreno MD   PCP: No primary care provider on file.  Note Started: 2:36 PM EDT 6/26/24     CHIEF COMPLAINT       Chief Complaint   Patient presents with    Altered Mental Status        HISTORY OF PRESENT ILLNESS: 1 or more elements      History From: Patient and EMS  HPI Limitations: Other (confusion)     Gwen Jorgensen is a 73 y.o. female who presents via EMS after a reported syncopal episode.  Patient is a bilateral below the knee amputation, uses a wheelchair.  She has a history of diabetes, hypertension.  EMS reportedly had a blood sugar of 74 when they got to the patient.  They state the patient was in a bathroom at [facility] when she passed out.  They had to break into the bathroom to get her off the floor.  They state that she was confused, and had had a bowel movement.  The patient states that she was trying to go use the restroom because she felt abdominal cramping and states that she needed to have a bowel movement while shopping.  She does not remember passing out.  She has no complaints of pain or discomfort at this time.  She reports some nausea.  Vital signs stable per EMS.  She lives with her daughter.  She denies chest pain, headache, abdominal pain at this time.     Nursing Notes were all reviewed and agreed with or any disagreements were addressed in the HPI.     REVIEW OF SYSTEMS      Review of Systems     Positives and Pertinent negatives as per HPI.    PAST HISTORY     Past Medical History:  Past Medical History:   Diagnosis Date    Diabetes (HCC)     High cholesterol     Hypertension          Past Surgical History:  Past Surgical History:   Procedure Laterality Date    CHOLECYSTECTOMY      COLONOSCOPY N/A 10/28/2019    COLONOSCOPY; POLYPECTOMY (CECUM/ASCENDING); CLIP PLACEMENT IN ASCENDING COLON performed by KRYSTAL Purvis MD at

## 2024-06-30 LAB
EKG ATRIAL RATE: 71 BPM
EKG DIAGNOSIS: NORMAL
EKG P AXIS: 70 DEGREES
EKG P-R INTERVAL: 280 MS
EKG Q-T INTERVAL: 428 MS
EKG QRS DURATION: 94 MS
EKG QTC CALCULATION (BAZETT): 465 MS
EKG R AXIS: -61 DEGREES
EKG T AXIS: 54 DEGREES
EKG VENTRICULAR RATE: 71 BPM

## 2024-08-17 ENCOUNTER — HOSPITAL ENCOUNTER (EMERGENCY)
Facility: HOSPITAL | Age: 74
Discharge: HOME | End: 2024-08-17
Payer: COMMERCIAL

## 2024-08-17 VITALS
SYSTOLIC BLOOD PRESSURE: 118 MMHG | HEART RATE: 85 BPM | WEIGHT: 199 LBS | OXYGEN SATURATION: 97 % | DIASTOLIC BLOOD PRESSURE: 79 MMHG | TEMPERATURE: 98.3 F | BODY MASS INDEX: 42.93 KG/M2 | RESPIRATION RATE: 15 BRPM | HEIGHT: 57 IN

## 2024-08-17 DIAGNOSIS — G54.6 PHANTOM LIMB PAIN (HCC): Primary | ICD-10-CM

## 2024-08-17 DIAGNOSIS — N30.00 ACUTE CYSTITIS WITHOUT HEMATURIA: ICD-10-CM

## 2024-08-17 DIAGNOSIS — R11.0 NAUSEA: ICD-10-CM

## 2024-08-17 DIAGNOSIS — Z89.519: ICD-10-CM

## 2024-08-17 LAB
ALBUMIN SERPL-MCNC: 3.5 G/DL (ref 3.4–5)
ALBUMIN/GLOB SERPL: 0.8 (ref 0.8–1.7)
ALP SERPL-CCNC: 129 U/L (ref 45–117)
ALT SERPL-CCNC: 46 U/L (ref 13–56)
ANION GAP SERPL CALC-SCNC: 7 MMOL/L (ref 3–18)
APPEARANCE UR: ABNORMAL
AST SERPL-CCNC: 23 U/L (ref 10–38)
BACTERIA URNS QL MICRO: ABNORMAL /HPF
BASOPHILS # BLD: 0 K/UL (ref 0–0.1)
BASOPHILS NFR BLD: 0 % (ref 0–2)
BILIRUB SERPL-MCNC: 0.4 MG/DL (ref 0.2–1)
BILIRUB UR QL: NEGATIVE
BUN SERPL-MCNC: 16 MG/DL (ref 7–18)
BUN/CREAT SERPL: 11 (ref 12–20)
CALCIUM SERPL-MCNC: 9.3 MG/DL (ref 8.5–10.1)
CHLORIDE SERPL-SCNC: 109 MMOL/L (ref 100–111)
CO2 SERPL-SCNC: 26 MMOL/L (ref 21–32)
COLOR UR: YELLOW
CREAT SERPL-MCNC: 1.44 MG/DL (ref 0.6–1.3)
DIFFERENTIAL METHOD BLD: ABNORMAL
EOSINOPHIL # BLD: 0.2 K/UL (ref 0–0.4)
EOSINOPHIL NFR BLD: 2 % (ref 0–5)
EPITH CASTS URNS QL MICRO: ABNORMAL /LPF (ref 0–5)
ERYTHROCYTE [DISTWIDTH] IN BLOOD BY AUTOMATED COUNT: 19 % (ref 11.6–14.5)
EST. AVERAGE GLUCOSE BLD GHB EST-MCNC: 194 MG/DL
GLOBULIN SER CALC-MCNC: 4.5 G/DL (ref 2–4)
GLUCOSE SERPL-MCNC: 208 MG/DL (ref 74–99)
GLUCOSE UR STRIP.AUTO-MCNC: NEGATIVE MG/DL
HBA1C MFR BLD: 8.4 % (ref 4.2–5.6)
HCT VFR BLD AUTO: 44.9 % (ref 35–45)
HGB BLD-MCNC: 13.6 G/DL (ref 12–16)
HGB UR QL STRIP: NEGATIVE
IMM GRANULOCYTES # BLD AUTO: 0 K/UL (ref 0–0.04)
IMM GRANULOCYTES NFR BLD AUTO: 0 % (ref 0–0.5)
KETONES UR QL STRIP.AUTO: ABNORMAL MG/DL
LEUKOCYTE ESTERASE UR QL STRIP.AUTO: ABNORMAL
LYMPHOCYTES # BLD: 2.9 K/UL (ref 0.9–3.6)
LYMPHOCYTES NFR BLD: 39 % (ref 21–52)
MAGNESIUM SERPL-MCNC: 2.2 MG/DL (ref 1.6–2.6)
MCH RBC QN AUTO: 23.8 PG (ref 24–34)
MCHC RBC AUTO-ENTMCNC: 30.3 G/DL (ref 31–37)
MCV RBC AUTO: 78.6 FL (ref 78–100)
MONOCYTES # BLD: 0.5 K/UL (ref 0.05–1.2)
MONOCYTES NFR BLD: 6 % (ref 3–10)
NEUTS SEG # BLD: 3.8 K/UL (ref 1.8–8)
NEUTS SEG NFR BLD: 52 % (ref 40–73)
NITRITE UR QL STRIP.AUTO: NEGATIVE
NRBC # BLD: 0 K/UL (ref 0–0.01)
NRBC BLD-RTO: 0 PER 100 WBC
PH UR STRIP: 6.5 (ref 5–8)
PLATELET # BLD AUTO: 238 K/UL (ref 135–420)
PMV BLD AUTO: 10.2 FL (ref 9.2–11.8)
POTASSIUM SERPL-SCNC: 3.8 MMOL/L (ref 3.5–5.5)
PROT SERPL-MCNC: 8 G/DL (ref 6.4–8.2)
PROT UR STRIP-MCNC: 100 MG/DL
RBC # BLD AUTO: 5.71 M/UL (ref 4.2–5.3)
RBC #/AREA URNS HPF: ABNORMAL /HPF (ref 0–5)
SODIUM SERPL-SCNC: 142 MMOL/L (ref 136–145)
SP GR UR REFRACTOMETRY: 1.02 (ref 1–1.03)
TROPONIN I SERPL HS-MCNC: 11 NG/L (ref 0–54)
UROBILINOGEN UR QL STRIP.AUTO: 1 EU/DL (ref 0.2–1)
WBC # BLD AUTO: 7.3 K/UL (ref 4.6–13.2)
WBC URNS QL MICRO: ABNORMAL /HPF (ref 0–5)

## 2024-08-17 PROCEDURE — 85025 COMPLETE CBC W/AUTO DIFF WBC: CPT

## 2024-08-17 PROCEDURE — 83735 ASSAY OF MAGNESIUM: CPT

## 2024-08-17 PROCEDURE — 83036 HEMOGLOBIN GLYCOSYLATED A1C: CPT

## 2024-08-17 PROCEDURE — 87186 SC STD MICRODIL/AGAR DIL: CPT

## 2024-08-17 PROCEDURE — 6370000000 HC RX 637 (ALT 250 FOR IP): Performed by: PHYSICIAN ASSISTANT

## 2024-08-17 PROCEDURE — 96376 TX/PRO/DX INJ SAME DRUG ADON: CPT

## 2024-08-17 PROCEDURE — 87086 URINE CULTURE/COLONY COUNT: CPT

## 2024-08-17 PROCEDURE — 96375 TX/PRO/DX INJ NEW DRUG ADDON: CPT

## 2024-08-17 PROCEDURE — 80053 COMPREHEN METABOLIC PANEL: CPT

## 2024-08-17 PROCEDURE — 96365 THER/PROPH/DIAG IV INF INIT: CPT

## 2024-08-17 PROCEDURE — 81001 URINALYSIS AUTO W/SCOPE: CPT

## 2024-08-17 PROCEDURE — 93005 ELECTROCARDIOGRAM TRACING: CPT | Performed by: PHYSICIAN ASSISTANT

## 2024-08-17 PROCEDURE — 6360000002 HC RX W HCPCS: Performed by: PHYSICIAN ASSISTANT

## 2024-08-17 PROCEDURE — 96366 THER/PROPH/DIAG IV INF ADDON: CPT

## 2024-08-17 PROCEDURE — 87088 URINE BACTERIA CULTURE: CPT

## 2024-08-17 PROCEDURE — 99284 EMERGENCY DEPT VISIT MOD MDM: CPT

## 2024-08-17 PROCEDURE — 84484 ASSAY OF TROPONIN QUANT: CPT

## 2024-08-17 RX ORDER — CEPHALEXIN 250 MG/1
500 CAPSULE ORAL
Status: COMPLETED | OUTPATIENT
Start: 2024-08-17 | End: 2024-08-17

## 2024-08-17 RX ORDER — CEPHALEXIN 500 MG/1
500 CAPSULE ORAL 2 TIMES DAILY
Qty: 14 CAPSULE | Refills: 0 | Status: SHIPPED | OUTPATIENT
Start: 2024-08-17 | End: 2024-08-24

## 2024-08-17 RX ORDER — CARVEDILOL 12.5 MG/1
12.5 TABLET ORAL
Status: COMPLETED | OUTPATIENT
Start: 2024-08-17 | End: 2024-08-17

## 2024-08-17 RX ORDER — ONDANSETRON 8 MG/1
8 TABLET, ORALLY DISINTEGRATING ORAL EVERY 8 HOURS PRN
Qty: 20 TABLET | Refills: 0 | Status: SHIPPED | OUTPATIENT
Start: 2024-08-17

## 2024-08-17 RX ORDER — ONDANSETRON 2 MG/ML
4 INJECTION INTRAMUSCULAR; INTRAVENOUS
Status: COMPLETED | OUTPATIENT
Start: 2024-08-17 | End: 2024-08-17

## 2024-08-17 RX ORDER — AMLODIPINE BESYLATE 10 MG/1
10 TABLET ORAL DAILY
COMMUNITY

## 2024-08-17 RX ORDER — PROMETHAZINE HYDROCHLORIDE 25 MG/1
25 TABLET ORAL EVERY 6 HOURS PRN
Qty: 20 TABLET | Refills: 0 | Status: SHIPPED | OUTPATIENT
Start: 2024-08-17 | End: 2024-08-24

## 2024-08-17 RX ORDER — MORPHINE SULFATE 2 MG/ML
2 INJECTION, SOLUTION INTRAMUSCULAR; INTRAVENOUS
Status: COMPLETED | OUTPATIENT
Start: 2024-08-17 | End: 2024-08-17

## 2024-08-17 RX ORDER — AMLODIPINE BESYLATE 5 MG/1
10 TABLET ORAL
Status: COMPLETED | OUTPATIENT
Start: 2024-08-17 | End: 2024-08-17

## 2024-08-17 RX ORDER — CARVEDILOL 12.5 MG/1
12.5 TABLET ORAL 2 TIMES DAILY
COMMUNITY

## 2024-08-17 RX ORDER — ROSUVASTATIN CALCIUM 20 MG/1
20 TABLET, COATED ORAL DAILY
COMMUNITY

## 2024-08-17 RX ORDER — TOLTERODINE 4 MG/1
1 CAPSULE, EXTENDED RELEASE ORAL DAILY
COMMUNITY
Start: 2018-02-13

## 2024-08-17 RX ADMIN — MORPHINE SULFATE 2 MG: 2 INJECTION, SOLUTION INTRAMUSCULAR; INTRAVENOUS at 12:04

## 2024-08-17 RX ADMIN — ONDANSETRON 4 MG: 2 INJECTION INTRAMUSCULAR; INTRAVENOUS at 12:04

## 2024-08-17 RX ADMIN — CEPHALEXIN 500 MG: 250 CAPSULE ORAL at 14:23

## 2024-08-17 RX ADMIN — MORPHINE SULFATE 2 MG: 2 INJECTION, SOLUTION INTRAMUSCULAR; INTRAVENOUS at 10:53

## 2024-08-17 RX ADMIN — AMLODIPINE BESYLATE 10 MG: 5 TABLET ORAL at 12:08

## 2024-08-17 RX ADMIN — ONDANSETRON 4 MG: 2 INJECTION INTRAMUSCULAR; INTRAVENOUS at 10:52

## 2024-08-17 RX ADMIN — CARVEDILOL 12.5 MG: 12.5 TABLET, FILM COATED ORAL at 12:08

## 2024-08-17 RX ADMIN — Medication 25 MG: at 14:17

## 2024-08-17 ASSESSMENT — PAIN DESCRIPTION - DESCRIPTORS
DESCRIPTORS: PRESSURE
DESCRIPTORS: PRESSURE
DESCRIPTORS: SHARP;STABBING
DESCRIPTORS: ACHING

## 2024-08-17 ASSESSMENT — PAIN DESCRIPTION - ORIENTATION
ORIENTATION: RIGHT;LEFT
ORIENTATION: RIGHT;LEFT
ORIENTATION: LEFT;RIGHT
ORIENTATION: LEFT;RIGHT

## 2024-08-17 ASSESSMENT — PAIN SCALES - GENERAL
PAINLEVEL_OUTOF10: 5
PAINLEVEL_OUTOF10: 4
PAINLEVEL_OUTOF10: 7
PAINLEVEL_OUTOF10: 8
PAINLEVEL_OUTOF10: 5

## 2024-08-17 ASSESSMENT — PAIN DESCRIPTION - LOCATION
LOCATION: LEG

## 2024-08-17 ASSESSMENT — PAIN DESCRIPTION - FREQUENCY: FREQUENCY: CONTINUOUS

## 2024-08-17 ASSESSMENT — PAIN - FUNCTIONAL ASSESSMENT
PAIN_FUNCTIONAL_ASSESSMENT: 0-10
PAIN_FUNCTIONAL_ASSESSMENT: 0-10

## 2024-08-17 ASSESSMENT — LIFESTYLE VARIABLES
HOW MANY STANDARD DRINKS CONTAINING ALCOHOL DO YOU HAVE ON A TYPICAL DAY: PATIENT DOES NOT DRINK
HOW OFTEN DO YOU HAVE A DRINK CONTAINING ALCOHOL: NEVER

## 2024-08-17 NOTE — ED TRIAGE NOTES
Pt presents to ED via EMS from home with her daughter due to BLE pain. Pt has bilateral below the knee amputation. Pt placed on cardiac monitor and showing NSR at a rate of 76. Pt on continuous pulse ox and maintaining at 98% on room air. Pt is hypertensive upon triage assessment, pt states she has not taken any of her medications since Wednesday because her vision is too blurry to read the labels. Pt states her vision has been worsening for the last week

## 2024-08-17 NOTE — ED NOTES
Pt has stress incontinent and brief was soiled. Pt cleaned and placed on bedpan for urine collection. Urine obtained and sent to lab. Purwick placed and patient clean and dry to protect skin integrity.

## 2024-08-17 NOTE — ED PROVIDER NOTES
[facility] EMERGENCY DEPT  EMERGENCY DEPARTMENT ENCOUNTER       Pt Name: Gwen Jorgensen  MRN: [id number]  Birthdate 1950  Date of evaluation: 8/17/2024  PCP: No primary care provider on file.  Note Started: 9:18 AM 8/17/24     CHIEF COMPLAINT       Chief Complaint   Patient presents with    Leg Pain     Bilateral Lower Extremity (phantom) pain that began three days ago. Pt had an episode of vomiting, pt has been able to eat today. BS was 179 for EMS        HISTORY OF PRESENT ILLNESS: 1 or more elements      History From: Patient  HPI Limitations: None  Chronic Conditions: HTN, HLP, DMII, bilateral BKA, chronic pain  Social Determinants affecting Dx or Tx: none      Gwen Jorgensen is a 73 y.o. female who presents to ED via EMS c/o phantom limb pain. Pain flared up 3 days ago. 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 She is on gabapentin and duloxetine for chronic pain. Pt notes she felt nauseous today and had an episode of emesis. Pain feels similar to previous episodes of chronic pain. . She notes last A1C 8%. No fever, chills, chest pain, SOB, stump lesions/swelling/color change/drainage, abdominal pain, diarrhea      Nursing Notes were all reviewed and agreed with or any disagreements were addressed in the HPI.    PAST HISTORY     Past Medical History:  Past Medical History:   Diagnosis Date    Diabetes (HCC)     High cholesterol     Hypertension        Past Surgical History:  Past Surgical History:   Procedure Laterality Date    CHOLECYSTECTOMY      COLONOSCOPY N/A 10/28/2019    COLONOSCOPY; POLYPECTOMY (CECUM/ASCENDING); CLIP PLACEMENT IN ASCENDING COLON performed by KRYSTAL Purvis MD at [facility] ENDOSCOPY    LOWER EXTREMITY AMPUTATION Bilateral        Family History:  History reviewed. No pertinent family history.    Social History:  Social History     Socioeconomic History    Marital status: Single     Spouse name: None    Number of children: None    Years of education: None    Highest education  REFERRED TO:  Nereida Bridges MD  [address] [facility]  [address]  [facility] [address] [phone]    Schedule an appointment as soon as possible for a visit       [facility] EMERGENCY DEPT  [address]  [place] [address]  [phone]    As needed, If symptoms worsen         DISCHARGE MEDICATIONS:     Medication List        START taking these medications      cephALEXin 500 MG capsule  Commonly known as: Keflex  Take 1 capsule by mouth 2 times daily for 7 days     ondansetron 8 MG Tbdp disintegrating tablet  Commonly known as: ZOFRAN-ODT  Place 1 tablet under the tongue every 8 hours as needed for Nausea or Vomiting     promethazine 25 MG tablet  Commonly known as: PHENERGAN  Take 1 tablet by mouth every 6 hours as needed for Nausea            ASK your doctor about these medications      amLODIPine 10 MG tablet  Commonly known as: NORVASC     carvedilol 12.5 MG tablet  Commonly known as: COREG     rosuvastatin 20 MG tablet  Commonly known as: CRESTOR     tolterodine 4 MG extended release capsule  Commonly known as: DETROL LA               Where to Get Your Medications        These medications were sent to [facility] [address] [place], [place] - [address] [facility] [phone] -  [phone] [address] [facility] [address]      Phone: [phone]   cephALEXin 500 MG capsule  ondansetron 8 MG Tbdp disintegrating tablet  promethazine 25 MG tablet                  I am the Primary Clinician of Record.       (Please note that parts of this dictation were completed with voice recognition software. Quite often unanticipated grammatical, syntax, homophones, and other interpretive errors are inadvertently transcribed by the computer software. Please disregards these errors. Please excuse any errors that have escaped final proofreading.)       Bree Kim PA-C  08/18/24 0874

## 2024-08-18 LAB
EKG ATRIAL RATE: 78 BPM
EKG DIAGNOSIS: NORMAL
EKG P AXIS: 79 DEGREES
EKG P-R INTERVAL: 266 MS
EKG Q-T INTERVAL: 412 MS
EKG QRS DURATION: 94 MS
EKG QTC CALCULATION (BAZETT): 469 MS
EKG R AXIS: -64 DEGREES
EKG T AXIS: 52 DEGREES
EKG VENTRICULAR RATE: 78 BPM

## 2024-08-18 PROCEDURE — 93010 ELECTROCARDIOGRAM REPORT: CPT | Performed by: INTERNAL MEDICINE

## 2024-08-19 NOTE — ED NOTES
10:33 AM EDT   8/19/24     Pt on keflex for UTI. C&S as below. Pt on appropriate ABX therapy.           NO SPECIAL REQUESTS [facility] IMMACULATE   [place] count        >100,000  COLONIES/mL P    [facility]   Culture    Gram negative rods IDENTIFICATION AND SUSCEPTIBILITY TO FOLLOW Abnormal  P [facility]   Culture         Abnormal   Aerococcus urinae A. URINAE HAS BEEN DESCRIBED AS SUSCEPTIBLE TO PENICILLIN, AMOXICILLIN, PIPERACILLIN, CEFIPIME, RIFAMPIN AND NITROFURANTOIN, BUT RESISTANT TO SULFONAMIDES. P    [facility]   Culture    MIXED UROGENITAL ASH ISOLATED P         Bree Kim PA-C  08/19/24 1031

## 2024-08-20 LAB
BACTERIA SPEC CULT: ABNORMAL
CC UR VC: ABNORMAL
SERVICE CMNT-IMP: ABNORMAL

## 2024-12-27 ENCOUNTER — HOSPITAL ENCOUNTER (INPATIENT)
Facility: HOSPITAL | Age: 74
LOS: 9 days | Discharge: NURSING, REHAB OR LONG-TERM CARE | DRG: 690 | End: 2025-01-05
Attending: EMERGENCY MEDICINE | Admitting: STUDENT IN AN ORGANIZED HEALTH CARE EDUCATION/TRAINING PROGRAM
Payer: MEDICARE

## 2024-12-27 ENCOUNTER — APPOINTMENT (OUTPATIENT)
Facility: HOSPITAL | Age: 74
DRG: 690 | End: 2024-12-27
Payer: MEDICARE

## 2024-12-27 DIAGNOSIS — R10.84 GENERALIZED ABDOMINAL PAIN: ICD-10-CM

## 2024-12-27 DIAGNOSIS — R78.81 BACTEREMIA: Primary | ICD-10-CM

## 2024-12-27 DIAGNOSIS — N39.0 URINARY TRACT INFECTION WITHOUT HEMATURIA, SITE UNSPECIFIED: ICD-10-CM

## 2024-12-27 DIAGNOSIS — G89.29 OTHER CHRONIC PAIN: ICD-10-CM

## 2024-12-27 DIAGNOSIS — E86.0 DEHYDRATION: ICD-10-CM

## 2024-12-27 DIAGNOSIS — R94.31 ABNORMAL EKG: ICD-10-CM

## 2024-12-27 DIAGNOSIS — R11.2 NAUSEA AND VOMITING, UNSPECIFIED VOMITING TYPE: ICD-10-CM

## 2024-12-27 LAB
ALBUMIN SERPL-MCNC: 3.2 G/DL (ref 3.4–5)
ALBUMIN/GLOB SERPL: 0.7 (ref 0.8–1.7)
ALP SERPL-CCNC: 193 U/L (ref 45–117)
ALT SERPL-CCNC: 226 U/L (ref 13–56)
ANION GAP SERPL CALC-SCNC: 8 MMOL/L (ref 3–18)
APPEARANCE UR: ABNORMAL
AST SERPL-CCNC: 174 U/L (ref 10–38)
BACTERIA URNS QL MICRO: ABNORMAL /HPF
BASOPHILS # BLD: 0 K/UL (ref 0–0.1)
BASOPHILS NFR BLD: 1 % (ref 0–2)
BILIRUB SERPL-MCNC: 0.6 MG/DL (ref 0.2–1)
BILIRUB UR QL: NEGATIVE
BUN SERPL-MCNC: 17 MG/DL (ref 7–18)
BUN/CREAT SERPL: 14 (ref 12–20)
CALCIUM SERPL-MCNC: 9.2 MG/DL (ref 8.5–10.1)
CHLORIDE SERPL-SCNC: 104 MMOL/L (ref 100–111)
CO2 SERPL-SCNC: 27 MMOL/L (ref 21–32)
COLOR UR: YELLOW
CREAT SERPL-MCNC: 1.25 MG/DL (ref 0.6–1.3)
DIFFERENTIAL METHOD BLD: ABNORMAL
EOSINOPHIL # BLD: 0.1 K/UL (ref 0–0.4)
EOSINOPHIL NFR BLD: 2 % (ref 0–5)
EPITH CASTS URNS QL MICRO: ABNORMAL /LPF (ref 0–5)
ERYTHROCYTE [DISTWIDTH] IN BLOOD BY AUTOMATED COUNT: 19.1 % (ref 11.6–14.5)
GLOBULIN SER CALC-MCNC: 4.6 G/DL (ref 2–4)
GLUCOSE SERPL-MCNC: 156 MG/DL (ref 74–99)
GLUCOSE UR STRIP.AUTO-MCNC: NEGATIVE MG/DL
HCT VFR BLD AUTO: 46 % (ref 35–45)
HGB BLD-MCNC: 13.8 G/DL (ref 12–16)
HGB UR QL STRIP: NEGATIVE
IMM GRANULOCYTES # BLD AUTO: 0 K/UL (ref 0–0.04)
IMM GRANULOCYTES NFR BLD AUTO: 0 % (ref 0–0.5)
KETONES UR QL STRIP.AUTO: 40 MG/DL
LACTATE SERPL-SCNC: 1.1 MMOL/L (ref 0.4–2)
LACTATE SERPL-SCNC: 1.2 MMOL/L (ref 0.4–2)
LEUKOCYTE ESTERASE UR QL STRIP.AUTO: ABNORMAL
LIPASE SERPL-CCNC: 13 U/L (ref 13–75)
LYMPHOCYTES # BLD: 3.1 K/UL (ref 0.9–3.6)
LYMPHOCYTES NFR BLD: 37 % (ref 21–52)
MCH RBC QN AUTO: 23.8 PG (ref 24–34)
MCHC RBC AUTO-ENTMCNC: 30 G/DL (ref 31–37)
MCV RBC AUTO: 79.2 FL (ref 78–100)
MONOCYTES # BLD: 0.4 K/UL (ref 0.05–1.2)
MONOCYTES NFR BLD: 5 % (ref 3–10)
NEUTS SEG # BLD: 4.6 K/UL (ref 1.8–8)
NEUTS SEG NFR BLD: 55 % (ref 40–73)
NITRITE UR QL STRIP.AUTO: POSITIVE
NRBC # BLD: 0 K/UL (ref 0–0.01)
NRBC BLD-RTO: 0 PER 100 WBC
PH UR STRIP: 6 (ref 5–8)
PLATELET # BLD AUTO: 194 K/UL (ref 135–420)
PMV BLD AUTO: 10.5 FL (ref 9.2–11.8)
POTASSIUM SERPL-SCNC: 3.9 MMOL/L (ref 3.5–5.5)
PROT SERPL-MCNC: 7.8 G/DL (ref 6.4–8.2)
PROT UR STRIP-MCNC: 100 MG/DL
RBC # BLD AUTO: 5.81 M/UL (ref 4.2–5.3)
RBC #/AREA URNS HPF: ABNORMAL /HPF (ref 0–5)
SODIUM SERPL-SCNC: 139 MMOL/L (ref 136–145)
SP GR UR REFRACTOMETRY: 1.02 (ref 1–1.03)
TROPONIN I SERPL HS-MCNC: 14 NG/L (ref 0–54)
UROBILINOGEN UR QL STRIP.AUTO: 1 EU/DL (ref 0.2–1)
WBC # BLD AUTO: 8.3 K/UL (ref 4.6–13.2)
WBC URNS QL MICRO: ABNORMAL /HPF (ref 0–5)

## 2024-12-27 PROCEDURE — 96374 THER/PROPH/DIAG INJ IV PUSH: CPT

## 2024-12-27 PROCEDURE — 87186 SC STD MICRODIL/AGAR DIL: CPT

## 2024-12-27 PROCEDURE — 83690 ASSAY OF LIPASE: CPT

## 2024-12-27 PROCEDURE — 84484 ASSAY OF TROPONIN QUANT: CPT

## 2024-12-27 PROCEDURE — 87154 CUL TYP ID BLD PTHGN 6+ TRGT: CPT

## 2024-12-27 PROCEDURE — 6360000004 HC RX CONTRAST MEDICATION: Performed by: STUDENT IN AN ORGANIZED HEALTH CARE EDUCATION/TRAINING PROGRAM

## 2024-12-27 PROCEDURE — 83605 ASSAY OF LACTIC ACID: CPT

## 2024-12-27 PROCEDURE — 71045 X-RAY EXAM CHEST 1 VIEW: CPT

## 2024-12-27 PROCEDURE — 87088 URINE BACTERIA CULTURE: CPT

## 2024-12-27 PROCEDURE — 2500000003 HC RX 250 WO HCPCS: Performed by: EMERGENCY MEDICINE

## 2024-12-27 PROCEDURE — 87086 URINE CULTURE/COLONY COUNT: CPT

## 2024-12-27 PROCEDURE — 51701 INSERT BLADDER CATHETER: CPT

## 2024-12-27 PROCEDURE — 80053 COMPREHEN METABOLIC PANEL: CPT

## 2024-12-27 PROCEDURE — 93005 ELECTROCARDIOGRAM TRACING: CPT | Performed by: EMERGENCY MEDICINE

## 2024-12-27 PROCEDURE — 85025 COMPLETE CBC W/AUTO DIFF WBC: CPT

## 2024-12-27 PROCEDURE — 87040 BLOOD CULTURE FOR BACTERIA: CPT

## 2024-12-27 PROCEDURE — 99285 EMERGENCY DEPT VISIT HI MDM: CPT

## 2024-12-27 PROCEDURE — 81001 URINALYSIS AUTO W/SCOPE: CPT

## 2024-12-27 PROCEDURE — 74177 CT ABD & PELVIS W/CONTRAST: CPT

## 2024-12-27 PROCEDURE — 1100000000 HC RM PRIVATE

## 2024-12-27 PROCEDURE — 74018 RADEX ABDOMEN 1 VIEW: CPT

## 2024-12-27 PROCEDURE — 6360000002 HC RX W HCPCS: Performed by: EMERGENCY MEDICINE

## 2024-12-27 RX ORDER — ACETAMINOPHEN 500 MG
1000 TABLET ORAL EVERY 6 HOURS PRN
Status: DISCONTINUED | OUTPATIENT
Start: 2024-12-27 | End: 2025-01-05 | Stop reason: HOSPADM

## 2024-12-27 RX ORDER — IOPAMIDOL 612 MG/ML
60 INJECTION, SOLUTION INTRAVASCULAR
Status: COMPLETED | OUTPATIENT
Start: 2024-12-27 | End: 2024-12-27

## 2024-12-27 RX ORDER — INSULIN LISPRO 100 [IU]/ML
0-8 INJECTION, SOLUTION INTRAVENOUS; SUBCUTANEOUS
Status: DISCONTINUED | OUTPATIENT
Start: 2024-12-28 | End: 2025-01-05 | Stop reason: HOSPADM

## 2024-12-27 RX ORDER — SODIUM CHLORIDE 9 MG/ML
INJECTION, SOLUTION INTRAVENOUS PRN
Status: DISCONTINUED | OUTPATIENT
Start: 2024-12-27 | End: 2025-01-05 | Stop reason: HOSPADM

## 2024-12-27 RX ORDER — ONDANSETRON 2 MG/ML
4 INJECTION INTRAMUSCULAR; INTRAVENOUS EVERY 4 HOURS PRN
Status: DISCONTINUED | OUTPATIENT
Start: 2024-12-27 | End: 2025-01-05 | Stop reason: HOSPADM

## 2024-12-27 RX ORDER — ROSUVASTATIN CALCIUM 10 MG/1
20 TABLET, COATED ORAL DAILY
Status: DISCONTINUED | OUTPATIENT
Start: 2024-12-28 | End: 2025-01-05 | Stop reason: HOSPADM

## 2024-12-27 RX ORDER — GABAPENTIN 100 MG/1
100 CAPSULE ORAL DAILY
Status: DISCONTINUED | OUTPATIENT
Start: 2024-12-28 | End: 2025-01-05 | Stop reason: HOSPADM

## 2024-12-27 RX ORDER — ACETAMINOPHEN 500 MG
1000 TABLET ORAL EVERY 6 HOURS PRN
COMMUNITY

## 2024-12-27 RX ORDER — POTASSIUM CHLORIDE 7.45 MG/ML
10 INJECTION INTRAVENOUS PRN
Status: DISCONTINUED | OUTPATIENT
Start: 2024-12-27 | End: 2025-01-05 | Stop reason: HOSPADM

## 2024-12-27 RX ORDER — ACETAMINOPHEN 650 MG/1
650 SUPPOSITORY RECTAL EVERY 6 HOURS PRN
Status: DISCONTINUED | OUTPATIENT
Start: 2024-12-27 | End: 2025-01-05 | Stop reason: HOSPADM

## 2024-12-27 RX ORDER — INSULIN GLARGINE 100 [IU]/ML
15 INJECTION, SOLUTION SUBCUTANEOUS NIGHTLY
Status: ON HOLD | COMMUNITY
End: 2025-01-05 | Stop reason: HOSPADM

## 2024-12-27 RX ORDER — POLYETHYLENE GLYCOL 3350 17 G/17G
17 POWDER, FOR SOLUTION ORAL DAILY PRN
Status: DISCONTINUED | OUTPATIENT
Start: 2024-12-27 | End: 2025-01-05 | Stop reason: HOSPADM

## 2024-12-27 RX ORDER — POTASSIUM CHLORIDE 1500 MG/1
40 TABLET, EXTENDED RELEASE ORAL PRN
Status: DISCONTINUED | OUTPATIENT
Start: 2024-12-27 | End: 2025-01-05 | Stop reason: HOSPADM

## 2024-12-27 RX ORDER — SODIUM CHLORIDE 0.9 % (FLUSH) 0.9 %
5-40 SYRINGE (ML) INJECTION PRN
Status: DISCONTINUED | OUTPATIENT
Start: 2024-12-27 | End: 2025-01-05 | Stop reason: HOSPADM

## 2024-12-27 RX ORDER — AMLODIPINE BESYLATE 5 MG/1
10 TABLET ORAL DAILY
Status: DISCONTINUED | OUTPATIENT
Start: 2024-12-28 | End: 2025-01-05 | Stop reason: HOSPADM

## 2024-12-27 RX ORDER — DEXTROSE MONOHYDRATE 100 MG/ML
INJECTION, SOLUTION INTRAVENOUS CONTINUOUS PRN
Status: DISCONTINUED | OUTPATIENT
Start: 2024-12-27 | End: 2025-01-05 | Stop reason: HOSPADM

## 2024-12-27 RX ORDER — ACETAMINOPHEN 325 MG/1
650 TABLET ORAL EVERY 6 HOURS PRN
Status: DISCONTINUED | OUTPATIENT
Start: 2024-12-27 | End: 2025-01-05 | Stop reason: HOSPADM

## 2024-12-27 RX ORDER — CARVEDILOL 12.5 MG/1
12.5 TABLET ORAL 2 TIMES DAILY
Status: DISCONTINUED | OUTPATIENT
Start: 2024-12-28 | End: 2025-01-05 | Stop reason: HOSPADM

## 2024-12-27 RX ORDER — MAGNESIUM SULFATE IN WATER 40 MG/ML
2000 INJECTION, SOLUTION INTRAVENOUS PRN
Status: DISCONTINUED | OUTPATIENT
Start: 2024-12-27 | End: 2025-01-05 | Stop reason: HOSPADM

## 2024-12-27 RX ORDER — SODIUM CHLORIDE 0.9 % (FLUSH) 0.9 %
5-40 SYRINGE (ML) INJECTION EVERY 12 HOURS SCHEDULED
Status: DISCONTINUED | OUTPATIENT
Start: 2024-12-28 | End: 2025-01-05 | Stop reason: HOSPADM

## 2024-12-27 RX ORDER — GABAPENTIN 100 MG/1
100 CAPSULE ORAL DAILY
Status: ON HOLD | COMMUNITY
End: 2025-01-05

## 2024-12-27 RX ORDER — ENOXAPARIN SODIUM 100 MG/ML
40 INJECTION SUBCUTANEOUS DAILY
Status: DISCONTINUED | OUTPATIENT
Start: 2024-12-28 | End: 2025-01-05 | Stop reason: HOSPADM

## 2024-12-27 RX ADMIN — ONDANSETRON 4 MG: 2 INJECTION INTRAMUSCULAR; INTRAVENOUS at 17:19

## 2024-12-27 RX ADMIN — IOPAMIDOL 60 ML: 612 INJECTION, SOLUTION INTRAVENOUS at 22:01

## 2024-12-27 RX ADMIN — WATER 1000 MG: 1 INJECTION INTRAMUSCULAR; INTRAVENOUS; SUBCUTANEOUS at 21:21

## 2024-12-27 ASSESSMENT — PAIN DESCRIPTION - ORIENTATION: ORIENTATION: LEFT;RIGHT

## 2024-12-27 ASSESSMENT — PAIN SCALES - GENERAL
PAINLEVEL_OUTOF10: 10
PAINLEVEL_OUTOF10: 8
PAINLEVEL_OUTOF10: 5

## 2024-12-27 ASSESSMENT — PAIN DESCRIPTION - ONSET: ONSET: ON-GOING

## 2024-12-27 ASSESSMENT — PAIN DESCRIPTION - LOCATION
LOCATION: HEAD
LOCATION: LEG

## 2024-12-27 ASSESSMENT — PAIN DESCRIPTION - DESCRIPTORS
DESCRIPTORS: ACHING
DESCRIPTORS: ACHING

## 2024-12-27 ASSESSMENT — PAIN - FUNCTIONAL ASSESSMENT: PAIN_FUNCTIONAL_ASSESSMENT: NONE - DENIES PAIN

## 2024-12-27 ASSESSMENT — PAIN DESCRIPTION - FREQUENCY: FREQUENCY: CONTINUOUS

## 2024-12-27 ASSESSMENT — PAIN DESCRIPTION - PAIN TYPE: TYPE: ACUTE PAIN

## 2024-12-27 NOTE — ED PROVIDER NOTES
[address] SURGICAL/ONCOLOGY  EMERGENCY DEPARTMENT ENCOUNTER       Pt Name: Gwen Jorgensen  MRN: [id number]  Birthdate 1950  Date of evaluation: 12/27/2024  PCP: No primary care provider on file.  Note Started: 2:30 AM 12/27/24     CHIEF COMPLAINT       Chief Complaint   Patient presents with    Dizziness    Nausea    Vomiting        HISTORY OF PRESENT ILLNESS: 1 or more elements      History From: Patient  HPI Limitations: None  Chronic Conditions: DM  Social Determinants affecting Dx or Tx: Lives at home alone    Gwen Jorgensen is a 74 y.o. female with history of diabetes, bilateral BKA who presents to ED c/o abdominal pain nausea vomiting since last night.  Patient had multiple episodes of nausea vomiting with abdominal cramping no diarrhea or fevers.  She called 911 because she felt weak and lightheaded.  She denies any focal weakness or vertigo.     Nursing Notes were all reviewed and agreed with or any disagreements were addressed in the HPI.    PAST HISTORY     Past Medical History:  Past Medical History:   Diagnosis Date    Diabetes (HCC)     High cholesterol     Hypertension        Past Surgical History:  Past Surgical History:   Procedure Laterality Date    CHOLECYSTECTOMY      COLONOSCOPY N/A 10/28/2019    COLONOSCOPY; POLYPECTOMY (CECUM/ASCENDING); CLIP PLACEMENT IN ASCENDING COLON performed by KRYSTAL Purvis MD at [facility] ENDOSCOPY    LOWER EXTREMITY AMPUTATION Bilateral        Family History:  No family history on file.    Social History:  Social History     Socioeconomic History    Marital status: Single     Spouse name: None    Number of children: None    Years of education: None    Highest education level: None   Tobacco Use    Smoking status: Former   Substance and Sexual Activity    Alcohol use: No    Drug use: Never     Social Determinants of Health     Food Insecurity: No Food Insecurity (12/27/2024)    Hunger Vital Sign     Worried About Running Out of Food in the Last Year: Never true     Ran

## 2024-12-27 NOTE — ED TRIAGE NOTES
Patient arrives via EMS from home with c/o dizziness, nausea and vomiting since midnight. History of HTN and DM. FSBS in [address]. Patient is bilateral BKA.

## 2024-12-28 LAB
EKG ATRIAL RATE: 91 BPM
EKG DIAGNOSIS: NORMAL
EKG P AXIS: 78 DEGREES
EKG P-R INTERVAL: 224 MS
EKG Q-T INTERVAL: 392 MS
EKG QRS DURATION: 86 MS
EKG QTC CALCULATION (BAZETT): 482 MS
EKG R AXIS: -72 DEGREES
EKG T AXIS: 61 DEGREES
EKG VENTRICULAR RATE: 91 BPM
EST. AVERAGE GLUCOSE BLD GHB EST-MCNC: 220 MG/DL
GLUCOSE BLD STRIP.AUTO-MCNC: 172 MG/DL (ref 70–110)
GLUCOSE BLD STRIP.AUTO-MCNC: 180 MG/DL (ref 70–110)
GLUCOSE BLD STRIP.AUTO-MCNC: 182 MG/DL (ref 70–110)
GLUCOSE BLD STRIP.AUTO-MCNC: 185 MG/DL (ref 70–110)
GLUCOSE BLD STRIP.AUTO-MCNC: 213 MG/DL (ref 70–110)
HBA1C MFR BLD: 9.3 % (ref 4.2–5.6)

## 2024-12-28 PROCEDURE — 97162 PT EVAL MOD COMPLEX 30 MIN: CPT

## 2024-12-28 PROCEDURE — 97110 THERAPEUTIC EXERCISES: CPT

## 2024-12-28 PROCEDURE — 2580000003 HC RX 258: Performed by: HOSPITALIST

## 2024-12-28 PROCEDURE — 2500000003 HC RX 250 WO HCPCS: Performed by: HOSPITALIST

## 2024-12-28 PROCEDURE — 6370000000 HC RX 637 (ALT 250 FOR IP): Performed by: STUDENT IN AN ORGANIZED HEALTH CARE EDUCATION/TRAINING PROGRAM

## 2024-12-28 PROCEDURE — 1100000000 HC RM PRIVATE

## 2024-12-28 PROCEDURE — 6370000000 HC RX 637 (ALT 250 FOR IP): Performed by: HOSPITALIST

## 2024-12-28 PROCEDURE — 2500000003 HC RX 250 WO HCPCS: Performed by: STUDENT IN AN ORGANIZED HEALTH CARE EDUCATION/TRAINING PROGRAM

## 2024-12-28 PROCEDURE — 87040 BLOOD CULTURE FOR BACTERIA: CPT

## 2024-12-28 PROCEDURE — 6360000002 HC RX W HCPCS: Performed by: STUDENT IN AN ORGANIZED HEALTH CARE EDUCATION/TRAINING PROGRAM

## 2024-12-28 PROCEDURE — 6360000002 HC RX W HCPCS: Performed by: HOSPITALIST

## 2024-12-28 PROCEDURE — 36415 COLL VENOUS BLD VENIPUNCTURE: CPT

## 2024-12-28 PROCEDURE — 83036 HEMOGLOBIN GLYCOSYLATED A1C: CPT

## 2024-12-28 PROCEDURE — 82962 GLUCOSE BLOOD TEST: CPT

## 2024-12-28 RX ORDER — SODIUM CHLORIDE 9 MG/ML
INJECTION, SOLUTION INTRAVENOUS CONTINUOUS
Status: DISCONTINUED | OUTPATIENT
Start: 2024-12-28 | End: 2024-12-28

## 2024-12-28 RX ORDER — LATANOPROST 50 UG/ML
1 SOLUTION/ DROPS OPHTHALMIC NIGHTLY
COMMUNITY

## 2024-12-28 RX ORDER — LATANOPROST 50 UG/ML
1 SOLUTION/ DROPS OPHTHALMIC NIGHTLY
Status: DISCONTINUED | OUTPATIENT
Start: 2024-12-28 | End: 2025-01-05 | Stop reason: HOSPADM

## 2024-12-28 RX ORDER — PREDNISOLONE ACETATE 10 MG/ML
1 SUSPENSION/ DROPS OPHTHALMIC 4 TIMES DAILY
COMMUNITY

## 2024-12-28 RX ORDER — BRIMONIDINE TARTRATE 2 MG/ML
1 SOLUTION/ DROPS OPHTHALMIC 3 TIMES DAILY
Status: DISCONTINUED | OUTPATIENT
Start: 2024-12-28 | End: 2025-01-05 | Stop reason: HOSPADM

## 2024-12-28 RX ORDER — DORZOLAMIDE HYDROCHLORIDE AND TIMOLOL MALEATE 20; 5 MG/ML; MG/ML
1 SOLUTION/ DROPS OPHTHALMIC 2 TIMES DAILY
COMMUNITY

## 2024-12-28 RX ORDER — TRAMADOL HYDROCHLORIDE 50 MG/1
50 TABLET ORAL EVERY 6 HOURS PRN
Status: DISCONTINUED | OUTPATIENT
Start: 2024-12-28 | End: 2024-12-31

## 2024-12-28 RX ORDER — DORZOLAMIDE HYDROCHLORIDE AND TIMOLOL MALEATE 20; 5 MG/ML; MG/ML
1 SOLUTION/ DROPS OPHTHALMIC 2 TIMES DAILY
Status: DISCONTINUED | OUTPATIENT
Start: 2024-12-28 | End: 2025-01-05 | Stop reason: HOSPADM

## 2024-12-28 RX ORDER — BRIMONIDINE TARTRATE 2 MG/ML
1 SOLUTION/ DROPS OPHTHALMIC 3 TIMES DAILY
COMMUNITY

## 2024-12-28 RX ORDER — PREDNISOLONE ACETATE 10 MG/ML
1 SUSPENSION/ DROPS OPHTHALMIC 4 TIMES DAILY
Status: DISCONTINUED | OUTPATIENT
Start: 2024-12-28 | End: 2025-01-05 | Stop reason: HOSPADM

## 2024-12-28 RX ORDER — KETOROLAC TROMETHAMINE 15 MG/ML
15 INJECTION, SOLUTION INTRAMUSCULAR; INTRAVENOUS EVERY 6 HOURS PRN
Status: DISPENSED | OUTPATIENT
Start: 2024-12-28 | End: 2025-01-02

## 2024-12-28 RX ORDER — SODIUM CHLORIDE 9 MG/ML
INJECTION, SOLUTION INTRAVENOUS CONTINUOUS
Status: DISCONTINUED | OUTPATIENT
Start: 2024-12-28 | End: 2024-12-30

## 2024-12-28 RX ADMIN — ENOXAPARIN SODIUM 40 MG: 100 INJECTION SUBCUTANEOUS at 09:04

## 2024-12-28 RX ADMIN — INSULIN LISPRO 2 UNITS: 100 INJECTION, SOLUTION INTRAVENOUS; SUBCUTANEOUS at 16:49

## 2024-12-28 RX ADMIN — SODIUM CHLORIDE, PRESERVATIVE FREE 10 ML: 5 INJECTION INTRAVENOUS at 09:04

## 2024-12-28 RX ADMIN — SODIUM CHLORIDE: 9 INJECTION, SOLUTION INTRAVENOUS at 15:02

## 2024-12-28 RX ADMIN — BRIMONIDINE TARTRATE 1 DROP: 2 SOLUTION OPHTHALMIC at 09:04

## 2024-12-28 RX ADMIN — TRAMADOL HYDROCHLORIDE 50 MG: 50 TABLET, COATED ORAL at 16:49

## 2024-12-28 RX ADMIN — BRIMONIDINE TARTRATE 1 DROP: 2 SOLUTION OPHTHALMIC at 14:57

## 2024-12-28 RX ADMIN — TRAMADOL HYDROCHLORIDE 50 MG: 50 TABLET, COATED ORAL at 23:05

## 2024-12-28 RX ADMIN — GABAPENTIN 100 MG: 100 CAPSULE ORAL at 01:24

## 2024-12-28 RX ADMIN — CARVEDILOL 12.5 MG: 12.5 TABLET, FILM COATED ORAL at 20:45

## 2024-12-28 RX ADMIN — ACETAMINOPHEN 1000 MG: 500 TABLET ORAL at 14:54

## 2024-12-28 RX ADMIN — INSULIN LISPRO 2 UNITS: 100 INJECTION, SOLUTION INTRAVENOUS; SUBCUTANEOUS at 21:11

## 2024-12-28 RX ADMIN — PREDNISOLONE ACETATE 1 DROP: 10 SUSPENSION/ DROPS OPHTHALMIC at 09:04

## 2024-12-28 RX ADMIN — DORZOLAMIDE HYDROCHLORIDE AND TIMOLOL MALEATE 1 DROP: 20; 5 SOLUTION/ DROPS OPHTHALMIC at 09:04

## 2024-12-28 RX ADMIN — INSULIN LISPRO 2 UNITS: 100 INJECTION, SOLUTION INTRAVENOUS; SUBCUTANEOUS at 01:49

## 2024-12-28 RX ADMIN — PREDNISOLONE ACETATE 1 DROP: 10 SUSPENSION/ DROPS OPHTHALMIC at 18:39

## 2024-12-28 RX ADMIN — ROSUVASTATIN CALCIUM 20 MG: 10 TABLET, FILM COATED ORAL at 09:03

## 2024-12-28 RX ADMIN — SODIUM CHLORIDE, PRESERVATIVE FREE 10 ML: 5 INJECTION INTRAVENOUS at 20:54

## 2024-12-28 RX ADMIN — KETOROLAC TROMETHAMINE 15 MG: 15 INJECTION, SOLUTION INTRAMUSCULAR; INTRAVENOUS at 12:20

## 2024-12-28 RX ADMIN — DORZOLAMIDE HYDROCHLORIDE AND TIMOLOL MALEATE 1 DROP: 20; 5 SOLUTION/ DROPS OPHTHALMIC at 20:45

## 2024-12-28 RX ADMIN — ACETAMINOPHEN 650 MG: 325 TABLET ORAL at 01:19

## 2024-12-28 RX ADMIN — CARVEDILOL 12.5 MG: 12.5 TABLET, FILM COATED ORAL at 09:03

## 2024-12-28 RX ADMIN — PREDNISOLONE ACETATE 1 DROP: 10 SUSPENSION/ DROPS OPHTHALMIC at 12:26

## 2024-12-28 RX ADMIN — CARVEDILOL 12.5 MG: 12.5 TABLET, FILM COATED ORAL at 01:19

## 2024-12-28 RX ADMIN — AMLODIPINE BESYLATE 10 MG: 5 TABLET ORAL at 09:03

## 2024-12-28 RX ADMIN — INSULIN LISPRO 2 UNITS: 100 INJECTION, SOLUTION INTRAVENOUS; SUBCUTANEOUS at 12:23

## 2024-12-28 RX ADMIN — WATER 1000 MG: 1 INJECTION INTRAMUSCULAR; INTRAVENOUS; SUBCUTANEOUS at 20:45

## 2024-12-28 ASSESSMENT — PAIN SCALES - GENERAL
PAINLEVEL_OUTOF10: 7
PAINLEVEL_OUTOF10: 10
PAINLEVEL_OUTOF10: 10
PAINLEVEL_OUTOF10: 5
PAINLEVEL_OUTOF10: 8
PAINLEVEL_OUTOF10: 0
PAINLEVEL_OUTOF10: 9
PAINLEVEL_OUTOF10: 9
PAINLEVEL_OUTOF10: 8
PAINLEVEL_OUTOF10: 6

## 2024-12-28 ASSESSMENT — PAIN DESCRIPTION - DESCRIPTORS
DESCRIPTORS: STABBING
DESCRIPTORS: STABBING
DESCRIPTORS: ACHING
DESCRIPTORS: STABBING

## 2024-12-28 ASSESSMENT — PAIN - FUNCTIONAL ASSESSMENT: PAIN_FUNCTIONAL_ASSESSMENT: ACTIVITIES ARE NOT PREVENTED

## 2024-12-28 ASSESSMENT — PAIN DESCRIPTION - ORIENTATION
ORIENTATION: RIGHT;LEFT
ORIENTATION: RIGHT;LEFT
ORIENTATION: LEFT;RIGHT
ORIENTATION: RIGHT;LEFT

## 2024-12-28 ASSESSMENT — PAIN DESCRIPTION - LOCATION
LOCATION: LEG
LOCATION: FOOT

## 2024-12-28 ASSESSMENT — PAIN DESCRIPTION - FREQUENCY: FREQUENCY: CONTINUOUS

## 2024-12-28 ASSESSMENT — PAIN DESCRIPTION - ONSET: ONSET: ON-GOING

## 2024-12-28 ASSESSMENT — PAIN DESCRIPTION - PAIN TYPE: TYPE: ACUTE PAIN

## 2024-12-28 NOTE — H&P
\"IONCA\"     Imaging results last 24 hrs :XR ABDOMEN (KUB) (SINGLE AP VIEW)    Result Date: 12/27/2024  EXAM:  XR ABDOMEN (KUB) (SINGLE AP VIEW) INDICATION: Vomiting with dizziness and nausea since midnight. COMPARISON: None. TECHNIQUE: Supine frontal abdomen (KUB). FINDINGS: No dilated small bowel. Stool and gas are present in the colon. Osseous structures are age appropriate. Vascular calcifications a possible left nephrolithiasis are noted. No other pathologic calcification. Cholecystectomy clips are noted.     No acute findings. Possible left nephrolithiasis. Electronically signed by Lakhwinder Jennings    XR CHEST PORTABLE    Result Date: 12/27/2024  EXAM:  XR CHEST PORTABLE INDICATION:   vomiting COMPARISON: 6/26/2024 radiograph. TECHNIQUE: Portable frontal view radiograph of the chest was acquired. FINDINGS: Lungs/Pleura: No pleural effusion or pneumothorax. No evidence of focal consolidation. Mediastinum: Heart, javed, mediastinum are within normal limits. Upper abdomen/soft tissue: Unremarkable. MSK: No acute osseous abnormalities.     No acute cardiopulmonary process or focal lung opacity. Electronically signed by LIZZY ANDRADE    Imaging results impression onlyXR ABDOMEN (KUB) (SINGLE AP VIEW)    Result Date: 12/27/2024  No acute findings. Possible left nephrolithiasis. Electronically signed by Lakhwinder Jennings    XR CHEST PORTABLE    Result Date: 12/27/2024  No acute cardiopulmonary process or focal lung opacity. Electronically signed by Clout     XR CHEST PORTABLE   Final Result   No acute cardiopulmonary process or focal lung opacity.            Electronically signed by [employer] SANTOSHD[employer] PlusNAYELI      XR ABDOMEN (KUB) (SINGLE AP VIEW)   Final Result   No acute findings. Possible left nephrolithiasis.         Electronically signed by Lakhwinder Jennings      CT ABDOMEN PELVIS W IV CONTRAST Additional Contrast? None    (Results Pending)       LAST EKG  Results for orders placed or performed during the hospital

## 2024-12-28 NOTE — PLAN OF CARE
Problem: Chronic Conditions and Co-morbidities  Goal: Patient's chronic conditions and co-morbidity symptoms are monitored and maintained or improved  Outcome: Progressing     Problem: Pain  Goal: Verbalizes/displays adequate comfort level or baseline comfort level  12/28/2024 1340 by Kelli Perez RN  Outcome: Progressing  12/28/2024 0320 by Yomaira Quevedo RN  Outcome: Progressing  Flowsheets (Taken 12/28/2024 0320)  Verbalizes/displays adequate comfort level or baseline comfort level:   Assess pain using appropriate pain scale   Encourage patient to monitor pain and request assistance   Administer analgesics based on type and severity of pain and evaluate response   Implement non-pharmacological measures as appropriate and evaluate response     Problem: Skin/Tissue Integrity  Goal: Absence of new skin breakdown  Description: 1.  Monitor for areas of redness and/or skin breakdown  2.  Assess vascular access sites hourly  3.  Every 4-6 hours minimum:  Change oxygen saturation probe site  4.  Every 4-6 hours:  If on nasal continuous positive airway pressure, respiratory therapy assess nares and determine need for appliance change or resting period.  Outcome: Progressing     Problem: Safety - Adult  Goal: Free from fall injury  12/28/2024 1340 by Kelli Perez RN  Outcome: Progressing  12/28/2024 0320 by Yomaira Quevedo RN  Outcome: Progressing  Flowsheets (Taken 12/28/2024 0320)  Free From Fall Injury: Instruct family/caregiver on patient safety     Problem: ABCDS Injury Assessment  Goal: Absence of physical injury  Outcome: Progressing

## 2024-12-28 NOTE — PLAN OF CARE
Problem: Pain  Goal: Verbalizes/displays adequate comfort level or baseline comfort level  Outcome: Progressing  Flowsheets (Taken 12/28/2024 0320)  Verbalizes/displays adequate comfort level or baseline comfort level:   Assess pain using appropriate pain scale   Encourage patient to monitor pain and request assistance   Administer analgesics based on type and severity of pain and evaluate response   Implement non-pharmacological measures as appropriate and evaluate response     Problem: Safety - Adult  Goal: Free from fall injury  Outcome: Progressing  Flowsheets (Taken 12/28/2024 0320)  Free From Fall Injury: Instruct family/caregiver on patient safety

## 2024-12-28 NOTE — CARE COORDINATION
12/28/24 1035   Service Assessment   Patient Orientation Alert and Oriented   Cognition Alert   History Provided By Patient   Primary Caregiver Self   Support Systems Family Members  (amy Wilde)   PCP Verified by CM Yes  (Dr. Bridges - dulce PCP)   Last Visit to PCP Within last 3 months   Prior Functional Level Assistance with the following:;Mobility   Current Functional Level Assistance with the following:;Mobility   Can patient return to prior living arrangement Unknown at present   Ability to make needs known: Good   Family able to assist with home care needs: Yes   Financial Resources Medicare   Community Resources Transportation  (uses [employer])   Social/Functional History   Lives With Family   Type of Home House   Home Layout Two level;Able to Live on Main level with bedroom/bathroom   Home Access Ramped entrance   Bathroom Toilet Standard   Bathroom Accessibility Wheelchair accessible   Home Equipment Wheelchair - Manual;Wheelchair - Electric   Receives Help From Family   Prior Level of Assist for ADLs Independent   Prior Level of Assist for Homemaking Needs assistance   Homemaking Responsibilities No   Ambulation Assistance Needs assistance   Prior Level of Assist for Transfers Independent   Active  No   Patient's  Info uses [employer]   Discharge Planning   Type of Residence House   Living Arrangements Family Members   Current Services Prior To Admission None   Potential Assistance Needed N/A   DME Ordered? No   Potential Assistance Purchasing Medications No   Type of Home Care Services None   Patient expects to be discharged to: House   One/Two Story Residence Two story, live on first floor   Services At/After Discharge   Transition of Care Consult (CM Consult) N/A   Services At/After Discharge None   Confirm Follow Up Transport Family   Condition of Participation: Discharge Planning   The Plan for Transition of Care is related to the following treatment goals: home when medically stable

## 2024-12-29 ENCOUNTER — APPOINTMENT (OUTPATIENT)
Facility: HOSPITAL | Age: 74
DRG: 690 | End: 2024-12-29
Payer: MEDICARE

## 2024-12-29 LAB
ALBUMIN SERPL-MCNC: 2.8 G/DL (ref 3.4–5)
ALBUMIN/GLOB SERPL: 0.7 (ref 0.8–1.7)
ALP SERPL-CCNC: 130 U/L (ref 45–117)
ALT SERPL-CCNC: 105 U/L (ref 13–56)
ANION GAP SERPL CALC-SCNC: 7 MMOL/L (ref 3–18)
AST SERPL-CCNC: 41 U/L (ref 10–38)
BASOPHILS # BLD: 0 K/UL (ref 0–0.1)
BASOPHILS NFR BLD: 0 % (ref 0–2)
BILIRUB SERPL-MCNC: 0.3 MG/DL (ref 0.2–1)
BUN SERPL-MCNC: 24 MG/DL (ref 7–18)
BUN/CREAT SERPL: 20 (ref 12–20)
CALCIUM SERPL-MCNC: 8.9 MG/DL (ref 8.5–10.1)
CHLORIDE SERPL-SCNC: 107 MMOL/L (ref 100–111)
CO2 SERPL-SCNC: 25 MMOL/L (ref 21–32)
CREAT SERPL-MCNC: 1.23 MG/DL (ref 0.6–1.3)
DIFFERENTIAL METHOD BLD: ABNORMAL
EOSINOPHIL # BLD: 0.2 K/UL (ref 0–0.4)
EOSINOPHIL NFR BLD: 3 % (ref 0–5)
ERYTHROCYTE [DISTWIDTH] IN BLOOD BY AUTOMATED COUNT: 18.3 % (ref 11.6–14.5)
GLOBULIN SER CALC-MCNC: 4 G/DL (ref 2–4)
GLUCOSE BLD STRIP.AUTO-MCNC: 170 MG/DL (ref 70–110)
GLUCOSE BLD STRIP.AUTO-MCNC: 172 MG/DL (ref 70–110)
GLUCOSE BLD STRIP.AUTO-MCNC: 191 MG/DL (ref 70–110)
GLUCOSE BLD STRIP.AUTO-MCNC: 247 MG/DL (ref 70–110)
GLUCOSE BLD STRIP.AUTO-MCNC: 257 MG/DL (ref 70–110)
GLUCOSE SERPL-MCNC: 200 MG/DL (ref 74–99)
HCT VFR BLD AUTO: 39.4 % (ref 35–45)
HGB BLD-MCNC: 11.8 G/DL (ref 12–16)
IMM GRANULOCYTES # BLD AUTO: 0 K/UL (ref 0–0.04)
IMM GRANULOCYTES NFR BLD AUTO: 0 % (ref 0–0.5)
LYMPHOCYTES # BLD: 2.6 K/UL (ref 0.9–3.6)
LYMPHOCYTES NFR BLD: 37 % (ref 21–52)
MAGNESIUM SERPL-MCNC: 2.1 MG/DL (ref 1.6–2.6)
MCH RBC QN AUTO: 24 PG (ref 24–34)
MCHC RBC AUTO-ENTMCNC: 29.9 G/DL (ref 31–37)
MCV RBC AUTO: 80.2 FL (ref 78–100)
MONOCYTES # BLD: 0.4 K/UL (ref 0.05–1.2)
MONOCYTES NFR BLD: 6 % (ref 3–10)
NEUTS SEG # BLD: 3.7 K/UL (ref 1.8–8)
NEUTS SEG NFR BLD: 53 % (ref 40–73)
NRBC # BLD: 0 K/UL (ref 0–0.01)
NRBC BLD-RTO: 0 PER 100 WBC
PLATELET # BLD AUTO: 193 K/UL (ref 135–420)
PMV BLD AUTO: 11 FL (ref 9.2–11.8)
POTASSIUM SERPL-SCNC: 4 MMOL/L (ref 3.5–5.5)
PROT SERPL-MCNC: 6.8 G/DL (ref 6.4–8.2)
RBC # BLD AUTO: 4.91 M/UL (ref 4.2–5.3)
SODIUM SERPL-SCNC: 139 MMOL/L (ref 136–145)
WBC # BLD AUTO: 7 K/UL (ref 4.6–13.2)

## 2024-12-29 PROCEDURE — 97530 THERAPEUTIC ACTIVITIES: CPT

## 2024-12-29 PROCEDURE — 6360000002 HC RX W HCPCS: Performed by: HOSPITALIST

## 2024-12-29 PROCEDURE — 85025 COMPLETE CBC W/AUTO DIFF WBC: CPT

## 2024-12-29 PROCEDURE — 2580000003 HC RX 258: Performed by: HOSPITALIST

## 2024-12-29 PROCEDURE — 6370000000 HC RX 637 (ALT 250 FOR IP): Performed by: STUDENT IN AN ORGANIZED HEALTH CARE EDUCATION/TRAINING PROGRAM

## 2024-12-29 PROCEDURE — 82962 GLUCOSE BLOOD TEST: CPT

## 2024-12-29 PROCEDURE — 97110 THERAPEUTIC EXERCISES: CPT

## 2024-12-29 PROCEDURE — 73560 X-RAY EXAM OF KNEE 1 OR 2: CPT

## 2024-12-29 PROCEDURE — 6360000002 HC RX W HCPCS: Performed by: STUDENT IN AN ORGANIZED HEALTH CARE EDUCATION/TRAINING PROGRAM

## 2024-12-29 PROCEDURE — 83735 ASSAY OF MAGNESIUM: CPT

## 2024-12-29 PROCEDURE — 2500000003 HC RX 250 WO HCPCS: Performed by: STUDENT IN AN ORGANIZED HEALTH CARE EDUCATION/TRAINING PROGRAM

## 2024-12-29 PROCEDURE — 1100000000 HC RM PRIVATE

## 2024-12-29 PROCEDURE — 2500000003 HC RX 250 WO HCPCS: Performed by: HOSPITALIST

## 2024-12-29 PROCEDURE — 6370000000 HC RX 637 (ALT 250 FOR IP): Performed by: HOSPITALIST

## 2024-12-29 PROCEDURE — 80053 COMPREHEN METABOLIC PANEL: CPT

## 2024-12-29 PROCEDURE — 36415 COLL VENOUS BLD VENIPUNCTURE: CPT

## 2024-12-29 RX ORDER — VANCOMYCIN 750 MG/150ML
750 INJECTION, SOLUTION INTRAVENOUS EVERY 24 HOURS
Status: DISCONTINUED | OUTPATIENT
Start: 2024-12-30 | End: 2024-12-29 | Stop reason: SDUPTHER

## 2024-12-29 RX ADMIN — BRIMONIDINE TARTRATE 1 DROP: 2 SOLUTION OPHTHALMIC at 09:03

## 2024-12-29 RX ADMIN — VANCOMYCIN HYDROCHLORIDE 2000 MG: 10 INJECTION, POWDER, LYOPHILIZED, FOR SOLUTION INTRAVENOUS at 07:04

## 2024-12-29 RX ADMIN — AMLODIPINE BESYLATE 10 MG: 5 TABLET ORAL at 08:54

## 2024-12-29 RX ADMIN — INSULIN LISPRO 2 UNITS: 100 INJECTION, SOLUTION INTRAVENOUS; SUBCUTANEOUS at 12:58

## 2024-12-29 RX ADMIN — GABAPENTIN 100 MG: 100 CAPSULE ORAL at 08:54

## 2024-12-29 RX ADMIN — CARVEDILOL 12.5 MG: 12.5 TABLET, FILM COATED ORAL at 20:37

## 2024-12-29 RX ADMIN — ROSUVASTATIN CALCIUM 20 MG: 10 TABLET, FILM COATED ORAL at 08:54

## 2024-12-29 RX ADMIN — ONDANSETRON 4 MG: 2 INJECTION INTRAMUSCULAR; INTRAVENOUS at 08:58

## 2024-12-29 RX ADMIN — ENOXAPARIN SODIUM 40 MG: 100 INJECTION SUBCUTANEOUS at 08:55

## 2024-12-29 RX ADMIN — CARVEDILOL 12.5 MG: 12.5 TABLET, FILM COATED ORAL at 08:54

## 2024-12-29 RX ADMIN — TRAMADOL HYDROCHLORIDE 50 MG: 50 TABLET, COATED ORAL at 14:11

## 2024-12-29 RX ADMIN — PREDNISOLONE ACETATE 1 DROP: 10 SUSPENSION/ DROPS OPHTHALMIC at 20:39

## 2024-12-29 RX ADMIN — DORZOLAMIDE HYDROCHLORIDE AND TIMOLOL MALEATE 1 DROP: 20; 5 SOLUTION/ DROPS OPHTHALMIC at 20:39

## 2024-12-29 RX ADMIN — LATANOPROST 1 DROP: 50 SOLUTION OPHTHALMIC at 22:55

## 2024-12-29 RX ADMIN — WATER 1000 MG: 1 INJECTION INTRAMUSCULAR; INTRAVENOUS; SUBCUTANEOUS at 20:38

## 2024-12-29 RX ADMIN — INSULIN LISPRO 2 UNITS: 100 INJECTION, SOLUTION INTRAVENOUS; SUBCUTANEOUS at 22:55

## 2024-12-29 RX ADMIN — ACETAMINOPHEN 1000 MG: 500 TABLET ORAL at 12:58

## 2024-12-29 RX ADMIN — PREDNISOLONE ACETATE 1 DROP: 10 SUSPENSION/ DROPS OPHTHALMIC at 13:59

## 2024-12-29 RX ADMIN — DORZOLAMIDE HYDROCHLORIDE AND TIMOLOL MALEATE 1 DROP: 20; 5 SOLUTION/ DROPS OPHTHALMIC at 09:03

## 2024-12-29 RX ADMIN — SODIUM CHLORIDE, PRESERVATIVE FREE 10 ML: 5 INJECTION INTRAVENOUS at 20:39

## 2024-12-29 RX ADMIN — PREDNISOLONE ACETATE 1 DROP: 10 SUSPENSION/ DROPS OPHTHALMIC at 09:03

## 2024-12-29 RX ADMIN — KETOROLAC TROMETHAMINE 15 MG: 15 INJECTION, SOLUTION INTRAMUSCULAR; INTRAVENOUS at 08:54

## 2024-12-29 RX ADMIN — KETOROLAC TROMETHAMINE 15 MG: 15 INJECTION, SOLUTION INTRAMUSCULAR; INTRAVENOUS at 20:37

## 2024-12-29 RX ADMIN — BRIMONIDINE TARTRATE 1 DROP: 2 SOLUTION OPHTHALMIC at 20:39

## 2024-12-29 RX ADMIN — BRIMONIDINE TARTRATE 1 DROP: 2 SOLUTION OPHTHALMIC at 13:59

## 2024-12-29 RX ADMIN — SODIUM CHLORIDE, PRESERVATIVE FREE 10 ML: 5 INJECTION INTRAVENOUS at 08:55

## 2024-12-29 RX ADMIN — SODIUM CHLORIDE: 9 INJECTION, SOLUTION INTRAVENOUS at 01:05

## 2024-12-29 RX ADMIN — PREDNISOLONE ACETATE 1 DROP: 10 SUSPENSION/ DROPS OPHTHALMIC at 18:49

## 2024-12-29 RX ADMIN — ONDANSETRON 4 MG: 2 INJECTION INTRAMUSCULAR; INTRAVENOUS at 20:37

## 2024-12-29 RX ADMIN — TRAMADOL HYDROCHLORIDE 50 MG: 50 TABLET, COATED ORAL at 07:00

## 2024-12-29 RX ADMIN — KETOROLAC TROMETHAMINE 15 MG: 15 INJECTION, SOLUTION INTRAMUSCULAR; INTRAVENOUS at 00:56

## 2024-12-29 ASSESSMENT — PAIN DESCRIPTION - ORIENTATION
ORIENTATION: RIGHT;LEFT

## 2024-12-29 ASSESSMENT — PAIN SCALES - GENERAL
PAINLEVEL_OUTOF10: 10
PAINLEVEL_OUTOF10: 10
PAINLEVEL_OUTOF10: 7
PAINLEVEL_OUTOF10: 8
PAINLEVEL_OUTOF10: 7

## 2024-12-29 ASSESSMENT — PAIN DESCRIPTION - LOCATION
LOCATION: LEG
LOCATION: LEG;HAND
LOCATION: HAND;ABDOMEN
LOCATION: LEG

## 2024-12-29 ASSESSMENT — PAIN - FUNCTIONAL ASSESSMENT: PAIN_FUNCTIONAL_ASSESSMENT: ACTIVITIES ARE NOT PREVENTED

## 2024-12-29 ASSESSMENT — PAIN SCALES - WONG BAKER: WONGBAKER_NUMERICALRESPONSE: HURTS LITTLE MORE

## 2024-12-29 ASSESSMENT — PAIN DESCRIPTION - DESCRIPTORS
DESCRIPTORS: THROBBING
DESCRIPTORS: STABBING
DESCRIPTORS: STABBING
DESCRIPTORS: ACHING;TIGHTNESS
DESCRIPTORS: ACHING;THROBBING

## 2024-12-29 NOTE — PLAN OF CARE
Problem: Pain  Goal: Verbalizes/displays adequate comfort level or baseline comfort level  Outcome: Progressing  Flowsheets (Taken 12/29/2024 7231)  Verbalizes/displays adequate comfort level or baseline comfort level:   Encourage patient to monitor pain and request assistance   Administer analgesics based on type and severity of pain and evaluate response   Consider cultural and social influences on pain and pain management   Assess pain using appropriate pain scale   Implement non-pharmacological measures as appropriate and evaluate response   Notify Licensed Independent Practitioner if interventions unsuccessful or patient reports new pain

## 2024-12-30 ENCOUNTER — APPOINTMENT (OUTPATIENT)
Facility: HOSPITAL | Age: 74
DRG: 690 | End: 2024-12-30
Attending: INTERNAL MEDICINE
Payer: MEDICARE

## 2024-12-30 PROBLEM — Z89.511 S/P BILATERAL BKA (BELOW KNEE AMPUTATION) (HCC): Status: ACTIVE | Noted: 2024-12-30

## 2024-12-30 PROBLEM — R78.81 POSITIVE BLOOD CULTURE: Status: ACTIVE | Noted: 2024-12-30

## 2024-12-30 PROBLEM — Z89.512 S/P BILATERAL BKA (BELOW KNEE AMPUTATION) (HCC): Status: ACTIVE | Noted: 2024-12-30

## 2024-12-30 LAB
ACB COMPLEX DNA BLD POS QL NAA+NON-PROBE: NOT DETECTED
ACCESSION NUMBER, LLC1M: ABNORMAL
ALBUMIN SERPL-MCNC: 2.6 G/DL (ref 3.4–5)
ALBUMIN/GLOB SERPL: 0.6 (ref 0.8–1.7)
ALP SERPL-CCNC: 121 U/L (ref 45–117)
ALT SERPL-CCNC: 78 U/L (ref 13–56)
ANION GAP SERPL CALC-SCNC: 5 MMOL/L (ref 3–18)
AST SERPL-CCNC: 29 U/L (ref 10–38)
B FRAGILIS DNA BLD POS QL NAA+NON-PROBE: NOT DETECTED
BASOPHILS # BLD: 0 K/UL (ref 0–0.1)
BASOPHILS NFR BLD: 0 % (ref 0–2)
BILIRUB SERPL-MCNC: 0.2 MG/DL (ref 0.2–1)
BIOFIRE TEST COMMENT: ABNORMAL
BUN SERPL-MCNC: 17 MG/DL (ref 7–18)
BUN/CREAT SERPL: 14 (ref 12–20)
C ALBICANS DNA BLD POS QL NAA+NON-PROBE: NOT DETECTED
C AURIS DNA BLD POS QL NAA+NON-PROBE: NOT DETECTED
C GATTII+NEOFOR DNA BLD POS QL NAA+N-PRB: NOT DETECTED
C GLABRATA DNA BLD POS QL NAA+NON-PROBE: NOT DETECTED
C KRUSEI DNA BLD POS QL NAA+NON-PROBE: NOT DETECTED
C PARAP DNA BLD POS QL NAA+NON-PROBE: NOT DETECTED
C TROPICLS DNA BLD POS QL NAA+NON-PROBE: NOT DETECTED
CALCIUM SERPL-MCNC: 8.8 MG/DL (ref 8.5–10.1)
CHLORIDE SERPL-SCNC: 108 MMOL/L (ref 100–111)
CO2 SERPL-SCNC: 24 MMOL/L (ref 21–32)
CREAT SERPL-MCNC: 1.18 MG/DL (ref 0.6–1.3)
DIFFERENTIAL METHOD BLD: ABNORMAL
E CLOAC COMP DNA BLD POS NAA+NON-PROBE: NOT DETECTED
E COLI DNA BLD POS QL NAA+NON-PROBE: NOT DETECTED
E FAECALIS DNA BLD POS QL NAA+NON-PROBE: NOT DETECTED
E FAECIUM DNA BLD POS QL NAA+NON-PROBE: NOT DETECTED
ENTEROBACTERALES DNA BLD POS NAA+N-PRB: NOT DETECTED
EOSINOPHIL # BLD: 0.3 K/UL (ref 0–0.4)
EOSINOPHIL NFR BLD: 4 % (ref 0–5)
ERYTHROCYTE [DISTWIDTH] IN BLOOD BY AUTOMATED COUNT: 18.1 % (ref 11.6–14.5)
GLOBULIN SER CALC-MCNC: 4.3 G/DL (ref 2–4)
GLUCOSE BLD STRIP.AUTO-MCNC: 193 MG/DL (ref 70–110)
GLUCOSE BLD STRIP.AUTO-MCNC: 195 MG/DL (ref 70–110)
GLUCOSE BLD STRIP.AUTO-MCNC: 200 MG/DL (ref 70–110)
GLUCOSE BLD STRIP.AUTO-MCNC: 213 MG/DL (ref 70–110)
GLUCOSE SERPL-MCNC: 213 MG/DL (ref 74–99)
GP B STREP DNA BLD POS QL NAA+NON-PROBE: NOT DETECTED
HAEM INFLU DNA BLD POS QL NAA+NON-PROBE: NOT DETECTED
HCT VFR BLD AUTO: 37.5 % (ref 35–45)
HGB BLD-MCNC: 11.2 G/DL (ref 12–16)
IMM GRANULOCYTES # BLD AUTO: 0 K/UL (ref 0–0.04)
IMM GRANULOCYTES NFR BLD AUTO: 1 % (ref 0–0.5)
K OXYTOCA DNA BLD POS QL NAA+NON-PROBE: NOT DETECTED
KLEBSIELLA SP DNA BLD POS QL NAA+NON-PRB: NOT DETECTED
KLEBSIELLA SP DNA BLD POS QL NAA+NON-PRB: NOT DETECTED
L MONOCYTOG DNA BLD POS QL NAA+NON-PROBE: NOT DETECTED
LYMPHOCYTES # BLD: 2.6 K/UL (ref 0.9–3.6)
LYMPHOCYTES NFR BLD: 39 % (ref 21–52)
MAGNESIUM SERPL-MCNC: 1.9 MG/DL (ref 1.6–2.6)
MCH RBC QN AUTO: 24.2 PG (ref 24–34)
MCHC RBC AUTO-ENTMCNC: 29.9 G/DL (ref 31–37)
MCV RBC AUTO: 81.2 FL (ref 78–100)
MECA+MECC ISLT/SPM QL: NOT DETECTED
MONOCYTES # BLD: 0.7 K/UL (ref 0.05–1.2)
MONOCYTES NFR BLD: 11 % (ref 3–10)
N MEN DNA BLD POS QL NAA+NON-PROBE: NOT DETECTED
NEUTS SEG # BLD: 3 K/UL (ref 1.8–8)
NEUTS SEG NFR BLD: 45 % (ref 40–73)
NRBC # BLD: 0 K/UL (ref 0–0.01)
NRBC BLD-RTO: 0 PER 100 WBC
P AERUGINOSA DNA BLD POS NAA+NON-PROBE: NOT DETECTED
PLATELET # BLD AUTO: 179 K/UL (ref 135–420)
PMV BLD AUTO: 10.8 FL (ref 9.2–11.8)
POTASSIUM SERPL-SCNC: 3.8 MMOL/L (ref 3.5–5.5)
PROT SERPL-MCNC: 6.9 G/DL (ref 6.4–8.2)
PROTEUS SP DNA BLD POS QL NAA+NON-PROBE: NOT DETECTED
RBC # BLD AUTO: 4.62 M/UL (ref 4.2–5.3)
RESISTANT GENE TARGETS: ABNORMAL
S AUREUS DNA BLD POS QL NAA+NON-PROBE: NOT DETECTED
S AUREUS+CONS DNA BLD POS NAA+NON-PROBE: DETECTED
S EPIDERMIDIS DNA BLD POS QL NAA+NON-PRB: DETECTED
S LUGDUNENSIS DNA BLD POS QL NAA+NON-PRB: NOT DETECTED
S MALTOPHILIA DNA BLD POS QL NAA+NON-PRB: NOT DETECTED
S MARCESCENS DNA BLD POS NAA+NON-PROBE: NOT DETECTED
S PNEUM DNA BLD POS QL NAA+NON-PROBE: NOT DETECTED
S PYO DNA BLD POS QL NAA+NON-PROBE: NOT DETECTED
SALMONELLA DNA BLD POS QL NAA+NON-PROBE: NOT DETECTED
SODIUM SERPL-SCNC: 137 MMOL/L (ref 136–145)
STREPTOCOCCUS DNA BLD POS NAA+NON-PROBE: NOT DETECTED
VANCOMYCIN SERPL-MCNC: 7.3 UG/ML (ref 5–40)
WBC # BLD AUTO: 6.6 K/UL (ref 4.6–13.2)

## 2024-12-30 PROCEDURE — 80202 ASSAY OF VANCOMYCIN: CPT

## 2024-12-30 PROCEDURE — 1100000000 HC RM PRIVATE

## 2024-12-30 PROCEDURE — 6360000002 HC RX W HCPCS: Performed by: HOSPITALIST

## 2024-12-30 PROCEDURE — 6370000000 HC RX 637 (ALT 250 FOR IP): Performed by: STUDENT IN AN ORGANIZED HEALTH CARE EDUCATION/TRAINING PROGRAM

## 2024-12-30 PROCEDURE — 97110 THERAPEUTIC EXERCISES: CPT

## 2024-12-30 PROCEDURE — 2580000003 HC RX 258: Performed by: HOSPITALIST

## 2024-12-30 PROCEDURE — 2500000003 HC RX 250 WO HCPCS: Performed by: INTERNAL MEDICINE

## 2024-12-30 PROCEDURE — 6370000000 HC RX 637 (ALT 250 FOR IP): Performed by: HOSPITALIST

## 2024-12-30 PROCEDURE — 83735 ASSAY OF MAGNESIUM: CPT

## 2024-12-30 PROCEDURE — 82962 GLUCOSE BLOOD TEST: CPT

## 2024-12-30 PROCEDURE — 6360000002 HC RX W HCPCS: Performed by: STUDENT IN AN ORGANIZED HEALTH CARE EDUCATION/TRAINING PROGRAM

## 2024-12-30 PROCEDURE — 85025 COMPLETE CBC W/AUTO DIFF WBC: CPT

## 2024-12-30 PROCEDURE — 36415 COLL VENOUS BLD VENIPUNCTURE: CPT

## 2024-12-30 PROCEDURE — 6360000002 HC RX W HCPCS: Performed by: INTERNAL MEDICINE

## 2024-12-30 PROCEDURE — 97166 OT EVAL MOD COMPLEX 45 MIN: CPT

## 2024-12-30 PROCEDURE — 2500000003 HC RX 250 WO HCPCS: Performed by: STUDENT IN AN ORGANIZED HEALTH CARE EDUCATION/TRAINING PROGRAM

## 2024-12-30 PROCEDURE — 80053 COMPREHEN METABOLIC PANEL: CPT

## 2024-12-30 RX ADMIN — PREDNISOLONE ACETATE 1 DROP: 10 SUSPENSION/ DROPS OPHTHALMIC at 18:45

## 2024-12-30 RX ADMIN — AMLODIPINE BESYLATE 10 MG: 5 TABLET ORAL at 08:49

## 2024-12-30 RX ADMIN — INSULIN LISPRO 2 UNITS: 100 INJECTION, SOLUTION INTRAVENOUS; SUBCUTANEOUS at 09:08

## 2024-12-30 RX ADMIN — ONDANSETRON 4 MG: 2 INJECTION INTRAMUSCULAR; INTRAVENOUS at 03:47

## 2024-12-30 RX ADMIN — TRAMADOL HYDROCHLORIDE 50 MG: 50 TABLET, COATED ORAL at 08:49

## 2024-12-30 RX ADMIN — ROSUVASTATIN CALCIUM 20 MG: 10 TABLET, FILM COATED ORAL at 08:49

## 2024-12-30 RX ADMIN — BRIMONIDINE TARTRATE 1 DROP: 2 SOLUTION OPHTHALMIC at 13:06

## 2024-12-30 RX ADMIN — KETOROLAC TROMETHAMINE 15 MG: 15 INJECTION, SOLUTION INTRAMUSCULAR; INTRAVENOUS at 02:28

## 2024-12-30 RX ADMIN — LATANOPROST 1 DROP: 50 SOLUTION OPHTHALMIC at 21:46

## 2024-12-30 RX ADMIN — DORZOLAMIDE HYDROCHLORIDE AND TIMOLOL MALEATE 1 DROP: 20; 5 SOLUTION/ DROPS OPHTHALMIC at 21:34

## 2024-12-30 RX ADMIN — PREDNISOLONE ACETATE 1 DROP: 10 SUSPENSION/ DROPS OPHTHALMIC at 13:05

## 2024-12-30 RX ADMIN — TRAMADOL HYDROCHLORIDE 50 MG: 50 TABLET, COATED ORAL at 18:41

## 2024-12-30 RX ADMIN — INSULIN LISPRO 2 UNITS: 100 INJECTION, SOLUTION INTRAVENOUS; SUBCUTANEOUS at 21:31

## 2024-12-30 RX ADMIN — SODIUM CHLORIDE, PRESERVATIVE FREE 10 ML: 5 INJECTION INTRAVENOUS at 21:47

## 2024-12-30 RX ADMIN — INSULIN LISPRO 2 UNITS: 100 INJECTION, SOLUTION INTRAVENOUS; SUBCUTANEOUS at 18:42

## 2024-12-30 RX ADMIN — BRIMONIDINE TARTRATE 1 DROP: 2 SOLUTION OPHTHALMIC at 09:15

## 2024-12-30 RX ADMIN — PREDNISOLONE ACETATE 1 DROP: 10 SUSPENSION/ DROPS OPHTHALMIC at 09:14

## 2024-12-30 RX ADMIN — PREDNISOLONE ACETATE 1 DROP: 10 SUSPENSION/ DROPS OPHTHALMIC at 21:32

## 2024-12-30 RX ADMIN — CARVEDILOL 12.5 MG: 12.5 TABLET, FILM COATED ORAL at 08:49

## 2024-12-30 RX ADMIN — SODIUM CHLORIDE: 9 INJECTION, SOLUTION INTRAVENOUS at 00:20

## 2024-12-30 RX ADMIN — DORZOLAMIDE HYDROCHLORIDE AND TIMOLOL MALEATE 1 DROP: 20; 5 SOLUTION/ DROPS OPHTHALMIC at 09:14

## 2024-12-30 RX ADMIN — BRIMONIDINE TARTRATE 1 DROP: 2 SOLUTION OPHTHALMIC at 21:33

## 2024-12-30 RX ADMIN — WATER 2000 MG: 1 INJECTION INTRAMUSCULAR; INTRAVENOUS; SUBCUTANEOUS at 21:30

## 2024-12-30 RX ADMIN — KETOROLAC TROMETHAMINE 15 MG: 15 INJECTION, SOLUTION INTRAMUSCULAR; INTRAVENOUS at 08:50

## 2024-12-30 RX ADMIN — CARVEDILOL 12.5 MG: 12.5 TABLET, FILM COATED ORAL at 21:31

## 2024-12-30 RX ADMIN — INSULIN LISPRO 2 UNITS: 100 INJECTION, SOLUTION INTRAVENOUS; SUBCUTANEOUS at 13:04

## 2024-12-30 RX ADMIN — TRAMADOL HYDROCHLORIDE 50 MG: 50 TABLET, COATED ORAL at 00:15

## 2024-12-30 RX ADMIN — ENOXAPARIN SODIUM 40 MG: 100 INJECTION SUBCUTANEOUS at 08:49

## 2024-12-30 RX ADMIN — GABAPENTIN 100 MG: 100 CAPSULE ORAL at 08:49

## 2024-12-30 ASSESSMENT — PAIN DESCRIPTION - LOCATION
LOCATION: LEG
LOCATION: LEG
LOCATION: LEG;HEAD
LOCATION: LEG
LOCATION: LEG;HEAD

## 2024-12-30 ASSESSMENT — PAIN DESCRIPTION - ORIENTATION
ORIENTATION: RIGHT;LEFT

## 2024-12-30 ASSESSMENT — PAIN SCALES - GENERAL
PAINLEVEL_OUTOF10: 7
PAINLEVEL_OUTOF10: 10
PAINLEVEL_OUTOF10: 7

## 2024-12-30 ASSESSMENT — PAIN DESCRIPTION - DESCRIPTORS
DESCRIPTORS: THROBBING
DESCRIPTORS: THROBBING;ACHING

## 2024-12-30 NOTE — CONSULTS
[facility]  (A Division of [facility])                                                           Date of Admission: 12/27/2024       Reason for Consult: Antibiotic management for bacteremia          C/C: N/V/Dizziness on presenting to ED    Current Antimicrobials:    Prior Antimicrobials:  Ceftriaxone 12/27 today  Vancomycin 12/29 today   N/A   Allergy to antibiotics: NA       Assessment:     Staphylococcus epidermidis high-grade bacteremia 4/4 -MSSA by bio fire: Concerned this could be real, no preliminary or abdominal findings found on imaging, urine was moderate pyuria, less likely this would be the source for bacteremia    Transaminitis-could be from infection, improving-ALT//174 -total bili normal, biliary tree was unremarkable on CT she is postcholecystectomy    Diffusely healed skin rash--could be the initial source  --Blood culture 12/27-4 out of 4 was coag negative staph, MSSA by bio fire, final ID and sensitivity pending  --Blood culture 12/20-NGSF  -- UA with WBC 11-20, culture in progress  --Respiratory  viral panel for adenovirus/Corona virus- COVID 19 and non COVID 19, human meta pneumovirus, rhino virus, Influenza A/B, parinflunenza 1-4, RSV negative. Bacterial PCR for bordetella, chlamydia pneumoniae and mycoplasma negative.      CXR-no acute finding   CT AP with IV-12/27:  IMPRESSION:  Fat-containing right lower quadrant abdominal hernia and incidentals  as above. No acute findings or other findings to correlate with abdominal pain or vomiting.    Medical History:   Diagnosis Date    Diabetes (HCC)--uncontrolled, A1c 9.3%     High cholesterol     Hypertension    Morbid obesity-BMI 44  Bilateral BKA      Recommendation -- ID related:     Will review this high-grade staph epi bacteremia with caution, even in absence of indwelling catheter or device    Streamline IV antibiotic to cefazolin 2 g every 8 hours  DC vancomycin, DC

## 2024-12-30 NOTE — PLAN OF CARE
Problem: Pain  Goal: Verbalizes/displays adequate comfort level or baseline comfort level  Outcome: Progressing  Flowsheets (Taken 12/30/2024 0151)  Verbalizes/displays adequate comfort level or baseline comfort level:   Administer analgesics based on type and severity of pain and evaluate response   Encourage patient to monitor pain and request assistance   Consider cultural and social influences on pain and pain management   Assess pain using appropriate pain scale   Implement non-pharmacological measures as appropriate and evaluate response   Notify Licensed Independent Practitioner if interventions unsuccessful or patient reports new pain

## 2024-12-30 NOTE — CARE COORDINATION
12/30/24 1245   /Social Work Whiteboard Notes   /Social Work Whiteboard 12/30 YELLOW SNF/ARU referrals initiated, pt provided list and pref are pending. Auth required prior to discharge       SW met with pt at bedside to discuss Rehab recs SNF vs ARU. Pt agreeable to Rehab and requested to provide 3 SNF and 3 ARU preferences. Pt stated she will choose her own facilities and stated she has  assist with the facility preference process.     SW to follow up with pt to obtain preferences in effort to secure facility and initiate auth.    SW requested the CMS to initiate SNF/ARU referrals     SPENCER Shen  Case Management Department

## 2024-12-31 ENCOUNTER — APPOINTMENT (OUTPATIENT)
Facility: HOSPITAL | Age: 74
DRG: 690 | End: 2024-12-31
Payer: MEDICARE

## 2024-12-31 ENCOUNTER — APPOINTMENT (OUTPATIENT)
Facility: HOSPITAL | Age: 74
DRG: 690 | End: 2024-12-31
Attending: INTERNAL MEDICINE
Payer: MEDICARE

## 2024-12-31 LAB
ALBUMIN SERPL-MCNC: 2.6 G/DL (ref 3.4–5)
ALBUMIN/GLOB SERPL: 0.6 (ref 0.8–1.7)
ALP SERPL-CCNC: 111 U/L (ref 45–117)
ALT SERPL-CCNC: 60 U/L (ref 13–56)
ANION GAP SERPL CALC-SCNC: 1 MMOL/L (ref 3–18)
AST SERPL-CCNC: 21 U/L (ref 10–38)
BACTERIA SPEC CULT: ABNORMAL
BACTERIA SPEC CULT: ABNORMAL
BASOPHILS # BLD: 0 K/UL (ref 0–0.1)
BASOPHILS NFR BLD: 1 % (ref 0–2)
BILIRUB SERPL-MCNC: 0.2 MG/DL (ref 0.2–1)
BUN SERPL-MCNC: 11 MG/DL (ref 7–18)
BUN/CREAT SERPL: 9 (ref 12–20)
CALCIUM SERPL-MCNC: 8.6 MG/DL (ref 8.5–10.1)
CHLORIDE SERPL-SCNC: 107 MMOL/L (ref 100–111)
CO2 SERPL-SCNC: 29 MMOL/L (ref 21–32)
CREAT SERPL-MCNC: 1.25 MG/DL (ref 0.6–1.3)
DIFFERENTIAL METHOD BLD: ABNORMAL
EOSINOPHIL # BLD: 0.2 K/UL (ref 0–0.4)
EOSINOPHIL NFR BLD: 4 % (ref 0–5)
ERYTHROCYTE [DISTWIDTH] IN BLOOD BY AUTOMATED COUNT: 17.9 % (ref 11.6–14.5)
GLOBULIN SER CALC-MCNC: 4.2 G/DL (ref 2–4)
GLUCOSE BLD STRIP.AUTO-MCNC: 184 MG/DL (ref 70–110)
GLUCOSE BLD STRIP.AUTO-MCNC: 189 MG/DL (ref 70–110)
GLUCOSE BLD STRIP.AUTO-MCNC: 216 MG/DL (ref 70–110)
GLUCOSE BLD STRIP.AUTO-MCNC: 225 MG/DL (ref 70–110)
GLUCOSE BLD STRIP.AUTO-MCNC: 230 MG/DL (ref 70–110)
GLUCOSE SERPL-MCNC: 188 MG/DL (ref 74–99)
GRAM STN SPEC: ABNORMAL
HAV IGM SER QL: NEGATIVE
HBV CORE IGM SER QL: NEGATIVE
HBV SURFACE AG SER QL: <0.1 INDEX
HBV SURFACE AG SER QL: NEGATIVE
HCT VFR BLD AUTO: 36.6 % (ref 35–45)
HGB BLD-MCNC: 11 G/DL (ref 12–16)
IMM GRANULOCYTES # BLD AUTO: 0 K/UL (ref 0–0.04)
IMM GRANULOCYTES NFR BLD AUTO: 1 % (ref 0–0.5)
LYMPHOCYTES # BLD: 2.7 K/UL (ref 0.9–3.6)
LYMPHOCYTES NFR BLD: 42 % (ref 21–52)
MAGNESIUM SERPL-MCNC: 1.9 MG/DL (ref 1.6–2.6)
MCH RBC QN AUTO: 24.2 PG (ref 24–34)
MCHC RBC AUTO-ENTMCNC: 30.1 G/DL (ref 31–37)
MCV RBC AUTO: 80.6 FL (ref 78–100)
MONOCYTES # BLD: 0.6 K/UL (ref 0.05–1.2)
MONOCYTES NFR BLD: 9 % (ref 3–10)
NEUTS SEG # BLD: 2.9 K/UL (ref 1.8–8)
NEUTS SEG NFR BLD: 45 % (ref 40–73)
NRBC # BLD: 0 K/UL (ref 0–0.01)
NRBC BLD-RTO: 0 PER 100 WBC
PLATELET # BLD AUTO: 173 K/UL (ref 135–420)
PMV BLD AUTO: 10.8 FL (ref 9.2–11.8)
POTASSIUM SERPL-SCNC: 3.7 MMOL/L (ref 3.5–5.5)
PROCALCITONIN SERPL-MCNC: <0.05 NG/ML
PROT SERPL-MCNC: 6.8 G/DL (ref 6.4–8.2)
RBC # BLD AUTO: 4.54 M/UL (ref 4.2–5.3)
SERVICE CMNT-IMP: ABNORMAL
SERVICE CMNT-IMP: ABNORMAL
SODIUM SERPL-SCNC: 137 MMOL/L (ref 136–145)
WBC # BLD AUTO: 6.6 K/UL (ref 4.6–13.2)

## 2024-12-31 PROCEDURE — 83735 ASSAY OF MAGNESIUM: CPT

## 2024-12-31 PROCEDURE — 6360000002 HC RX W HCPCS: Performed by: STUDENT IN AN ORGANIZED HEALTH CARE EDUCATION/TRAINING PROGRAM

## 2024-12-31 PROCEDURE — 2500000003 HC RX 250 WO HCPCS: Performed by: INTERNAL MEDICINE

## 2024-12-31 PROCEDURE — 6360000002 HC RX W HCPCS: Performed by: INTERNAL MEDICINE

## 2024-12-31 PROCEDURE — 6360000002 HC RX W HCPCS: Performed by: HOSPITALIST

## 2024-12-31 PROCEDURE — 84145 PROCALCITONIN (PCT): CPT

## 2024-12-31 PROCEDURE — 2500000003 HC RX 250 WO HCPCS: Performed by: STUDENT IN AN ORGANIZED HEALTH CARE EDUCATION/TRAINING PROGRAM

## 2024-12-31 PROCEDURE — 87340 HEPATITIS B SURFACE AG IA: CPT

## 2024-12-31 PROCEDURE — 6370000000 HC RX 637 (ALT 250 FOR IP): Performed by: STUDENT IN AN ORGANIZED HEALTH CARE EDUCATION/TRAINING PROGRAM

## 2024-12-31 PROCEDURE — 6370000000 HC RX 637 (ALT 250 FOR IP): Performed by: HOSPITALIST

## 2024-12-31 PROCEDURE — 86709 HEPATITIS A IGM ANTIBODY: CPT

## 2024-12-31 PROCEDURE — 86705 HEP B CORE ANTIBODY IGM: CPT

## 2024-12-31 PROCEDURE — 1100000000 HC RM PRIVATE

## 2024-12-31 PROCEDURE — 93306 TTE W/DOPPLER COMPLETE: CPT

## 2024-12-31 PROCEDURE — 82962 GLUCOSE BLOOD TEST: CPT

## 2024-12-31 PROCEDURE — 80053 COMPREHEN METABOLIC PANEL: CPT

## 2024-12-31 PROCEDURE — 85025 COMPLETE CBC W/AUTO DIFF WBC: CPT

## 2024-12-31 PROCEDURE — 87522 HEPATITIS C REVRS TRNSCRPJ: CPT

## 2024-12-31 PROCEDURE — 36415 COLL VENOUS BLD VENIPUNCTURE: CPT

## 2024-12-31 RX ORDER — HYDRALAZINE HYDROCHLORIDE 20 MG/ML
20 INJECTION INTRAMUSCULAR; INTRAVENOUS EVERY 6 HOURS PRN
Status: DISCONTINUED | OUTPATIENT
Start: 2024-12-31 | End: 2025-01-05 | Stop reason: HOSPADM

## 2024-12-31 RX ORDER — TRAMADOL HYDROCHLORIDE 50 MG/1
75 TABLET ORAL EVERY 6 HOURS PRN
Status: DISCONTINUED | OUTPATIENT
Start: 2024-12-31 | End: 2025-01-05 | Stop reason: HOSPADM

## 2024-12-31 RX ORDER — HYDROMORPHONE HYDROCHLORIDE 1 MG/ML
0.5 INJECTION, SOLUTION INTRAMUSCULAR; INTRAVENOUS; SUBCUTANEOUS EVERY 4 HOURS PRN
Status: DISCONTINUED | OUTPATIENT
Start: 2024-12-31 | End: 2025-01-02

## 2024-12-31 RX ADMIN — HYDROMORPHONE HYDROCHLORIDE 0.5 MG: 1 INJECTION, SOLUTION INTRAMUSCULAR; INTRAVENOUS; SUBCUTANEOUS at 20:14

## 2024-12-31 RX ADMIN — WATER 2000 MG: 1 INJECTION INTRAMUSCULAR; INTRAVENOUS; SUBCUTANEOUS at 14:27

## 2024-12-31 RX ADMIN — CARVEDILOL 12.5 MG: 12.5 TABLET, FILM COATED ORAL at 22:14

## 2024-12-31 RX ADMIN — INSULIN LISPRO 2 UNITS: 100 INJECTION, SOLUTION INTRAVENOUS; SUBCUTANEOUS at 22:13

## 2024-12-31 RX ADMIN — KETOROLAC TROMETHAMINE 15 MG: 15 INJECTION, SOLUTION INTRAMUSCULAR; INTRAVENOUS at 23:25

## 2024-12-31 RX ADMIN — SODIUM CHLORIDE, PRESERVATIVE FREE 10 ML: 5 INJECTION INTRAVENOUS at 22:18

## 2024-12-31 RX ADMIN — PREDNISOLONE ACETATE 1 DROP: 10 SUSPENSION/ DROPS OPHTHALMIC at 17:30

## 2024-12-31 RX ADMIN — HYDRALAZINE HYDROCHLORIDE 20 MG: 20 INJECTION INTRAMUSCULAR; INTRAVENOUS at 19:59

## 2024-12-31 RX ADMIN — HYDRALAZINE HYDROCHLORIDE 20 MG: 20 INJECTION INTRAMUSCULAR; INTRAVENOUS at 08:40

## 2024-12-31 RX ADMIN — TRAMADOL HYDROCHLORIDE 50 MG: 50 TABLET, COATED ORAL at 00:10

## 2024-12-31 RX ADMIN — GABAPENTIN 100 MG: 100 CAPSULE ORAL at 10:03

## 2024-12-31 RX ADMIN — BRIMONIDINE TARTRATE 1 DROP: 2 SOLUTION OPHTHALMIC at 22:17

## 2024-12-31 RX ADMIN — DORZOLAMIDE HYDROCHLORIDE AND TIMOLOL MALEATE 1 DROP: 20; 5 SOLUTION/ DROPS OPHTHALMIC at 10:07

## 2024-12-31 RX ADMIN — INSULIN LISPRO 2 UNITS: 100 INJECTION, SOLUTION INTRAVENOUS; SUBCUTANEOUS at 18:59

## 2024-12-31 RX ADMIN — HYDROMORPHONE HYDROCHLORIDE 0.5 MG: 1 INJECTION, SOLUTION INTRAMUSCULAR; INTRAVENOUS; SUBCUTANEOUS at 14:27

## 2024-12-31 RX ADMIN — ROSUVASTATIN CALCIUM 20 MG: 10 TABLET, FILM COATED ORAL at 10:02

## 2024-12-31 RX ADMIN — WATER 2000 MG: 1 INJECTION INTRAMUSCULAR; INTRAVENOUS; SUBCUTANEOUS at 22:14

## 2024-12-31 RX ADMIN — PREDNISOLONE ACETATE 1 DROP: 10 SUSPENSION/ DROPS OPHTHALMIC at 10:08

## 2024-12-31 RX ADMIN — PREDNISOLONE ACETATE 1 DROP: 10 SUSPENSION/ DROPS OPHTHALMIC at 13:28

## 2024-12-31 RX ADMIN — TRAMADOL HYDROCHLORIDE 75 MG: 50 TABLET, COATED ORAL at 23:24

## 2024-12-31 RX ADMIN — BRIMONIDINE TARTRATE 1 DROP: 2 SOLUTION OPHTHALMIC at 14:28

## 2024-12-31 RX ADMIN — WATER 2000 MG: 1 INJECTION INTRAMUSCULAR; INTRAVENOUS; SUBCUTANEOUS at 06:10

## 2024-12-31 RX ADMIN — HYDROMORPHONE HYDROCHLORIDE 0.5 MG: 1 INJECTION, SOLUTION INTRAMUSCULAR; INTRAVENOUS; SUBCUTANEOUS at 10:02

## 2024-12-31 RX ADMIN — LATANOPROST 1 DROP: 50 SOLUTION OPHTHALMIC at 22:14

## 2024-12-31 RX ADMIN — CARVEDILOL 12.5 MG: 12.5 TABLET, FILM COATED ORAL at 10:02

## 2024-12-31 RX ADMIN — ACETAMINOPHEN 1000 MG: 500 TABLET ORAL at 11:42

## 2024-12-31 RX ADMIN — KETOROLAC TROMETHAMINE 15 MG: 15 INJECTION, SOLUTION INTRAMUSCULAR; INTRAVENOUS at 00:10

## 2024-12-31 RX ADMIN — DORZOLAMIDE HYDROCHLORIDE AND TIMOLOL MALEATE 1 DROP: 20; 5 SOLUTION/ DROPS OPHTHALMIC at 22:16

## 2024-12-31 RX ADMIN — AMLODIPINE BESYLATE 10 MG: 5 TABLET ORAL at 10:02

## 2024-12-31 RX ADMIN — BRIMONIDINE TARTRATE 1 DROP: 2 SOLUTION OPHTHALMIC at 10:08

## 2024-12-31 RX ADMIN — ENOXAPARIN SODIUM 40 MG: 100 INJECTION SUBCUTANEOUS at 10:03

## 2024-12-31 RX ADMIN — INSULIN LISPRO 2 UNITS: 100 INJECTION, SOLUTION INTRAVENOUS; SUBCUTANEOUS at 08:22

## 2024-12-31 RX ADMIN — KETOROLAC TROMETHAMINE 15 MG: 15 INJECTION, SOLUTION INTRAMUSCULAR; INTRAVENOUS at 08:39

## 2024-12-31 RX ADMIN — PREDNISOLONE ACETATE 1 DROP: 10 SUSPENSION/ DROPS OPHTHALMIC at 22:15

## 2024-12-31 RX ADMIN — KETOROLAC TROMETHAMINE 15 MG: 15 INJECTION, SOLUTION INTRAMUSCULAR; INTRAVENOUS at 14:27

## 2024-12-31 RX ADMIN — INSULIN LISPRO 2 UNITS: 100 INJECTION, SOLUTION INTRAVENOUS; SUBCUTANEOUS at 12:32

## 2024-12-31 RX ADMIN — TRAMADOL HYDROCHLORIDE 50 MG: 50 TABLET, COATED ORAL at 08:39

## 2024-12-31 ASSESSMENT — PAIN SCALES - GENERAL
PAINLEVEL_OUTOF10: 4
PAINLEVEL_OUTOF10: 2
PAINLEVEL_OUTOF10: 4
PAINLEVEL_OUTOF10: 8
PAINLEVEL_OUTOF10: 4
PAINLEVEL_OUTOF10: 5
PAINLEVEL_OUTOF10: 4
PAINLEVEL_OUTOF10: 8
PAINLEVEL_OUTOF10: 8
PAINLEVEL_OUTOF10: 4
PAINLEVEL_OUTOF10: 4
PAINLEVEL_OUTOF10: 8
PAINLEVEL_OUTOF10: 8

## 2024-12-31 ASSESSMENT — PAIN SCALES - WONG BAKER
WONGBAKER_NUMERICALRESPONSE: HURTS A LITTLE BIT
WONGBAKER_NUMERICALRESPONSE: HURTS LITTLE MORE
WONGBAKER_NUMERICALRESPONSE: HURTS A LITTLE BIT
WONGBAKER_NUMERICALRESPONSE: HURTS A LITTLE BIT

## 2024-12-31 ASSESSMENT — PAIN DESCRIPTION - DESCRIPTORS
DESCRIPTORS: ACHING
DESCRIPTORS: ACHING
DESCRIPTORS: THROBBING

## 2024-12-31 ASSESSMENT — PAIN DESCRIPTION - ORIENTATION
ORIENTATION: LEFT;RIGHT
ORIENTATION: LEFT;RIGHT
ORIENTATION: RIGHT;LEFT

## 2024-12-31 ASSESSMENT — PAIN DESCRIPTION - LOCATION
LOCATION: LEG

## 2024-12-31 NOTE — CARE COORDINATION
Pt discussed in IDRs, pt not medically stable and requiring additional imaging. ARI met with pt at bedside and obtained pts SNF preferences; [facility], [facility], and [facility]. SW informed pt of the accepted facilities listed below in the event her preferences can't accommodate her, pt verbalized understanding.    [facility] 12/30/2024 02:17 PM  Accepted      [facility] 12/30/2024 02:17 PM  Accepted    [facility] 12/30/2024 02:17 PM  Accepted      [facility] 12/30/2024 02:17 PM  Accepted    ADDENDUM @ 11:18 am    [facility] declined however, [facility] can accept pt. Per the admissions rep at [facility]  [phone] pt will need an updated PT note prior to starting an auth. ARI placed call to the therapy department and requested an updated PT note as time permits.     Plan is for pt to obtain updated therapy notes and auth to be initiated on Thursday 1/2. Per admissions pts insurance typically provides same day auths.     Pt made aware that the below preferred facility can accept her pending auth.       [facility]    Basic Information    Address:  [address] [place] [address]  [facility]      SPENCER Shen  Case Management Department

## 2024-12-31 NOTE — WOUND CARE
Wound Care Note:    Chart audited for low robert score of 14, patient with high risk for skin breakdown.  Does patient have documentation of pressure injury?  no.  Robert score order set started on 12/31/2024      Skin Care & Pressure Relief Recommendations  Minimize layers of linen  Pads under patient to optimize support surface  Turn/reposition approximately every 2 hours  Use pillow wedges to maintain positioning but do not put pillow directly over wounds  Manage incontinence   Promote continence; Skin Protective lotion/cream to buttocks and sacrum daily and as needed with incontinence care  Offload heels pillows    Consult wound care if any wounds noted during admission

## 2025-01-01 ENCOUNTER — APPOINTMENT (OUTPATIENT)
Facility: HOSPITAL | Age: 75
DRG: 690 | End: 2025-01-01
Payer: MEDICARE

## 2025-01-01 PROBLEM — N39.0 UTI (URINARY TRACT INFECTION): Status: ACTIVE | Noted: 2025-01-01

## 2025-01-01 LAB
BACTERIA SPEC CULT: ABNORMAL
BACTERIA SPEC CULT: ABNORMAL
CC UR VC: ABNORMAL
ECHO AO ASC DIAM: 3.4 CM
ECHO AO ASCENDING AORTA INDEX: 1.87 CM/M2
ECHO AO ROOT DIAM: 3.2 CM
ECHO AO ROOT INDEX: 1.76 CM/M2
ECHO AV AREA PEAK VELOCITY: 1.7 CM2
ECHO AV AREA VTI: 1.6 CM2
ECHO AV AREA/BSA PEAK VELOCITY: 0.9 CM2/M2
ECHO AV AREA/BSA VTI: 0.9 CM2/M2
ECHO AV MEAN GRADIENT: 11 MMHG
ECHO AV MEAN VELOCITY: 1.5 M/S
ECHO AV PEAK GRADIENT: 19 MMHG
ECHO AV PEAK VELOCITY: 2.2 M/S
ECHO AV VELOCITY RATIO: 0.64
ECHO AV VTI: 49.7 CM
ECHO BSA: 1.94 M2
ECHO EST RA PRESSURE: 3 MMHG
ECHO LA DIAMETER INDEX: 2.03 CM/M2
ECHO LA DIAMETER: 3.7 CM
ECHO LA TO AORTIC ROOT RATIO: 1.16
ECHO LA VOL A-L A2C: 63 ML (ref 22–52)
ECHO LA VOL A-L A4C: 69 ML (ref 22–52)
ECHO LA VOL BP: 64 ML (ref 22–52)
ECHO LA VOL MOD A2C: 58 ML (ref 22–52)
ECHO LA VOL MOD A4C: 67 ML (ref 22–52)
ECHO LA VOL/BSA BIPLANE: 35 ML/M2 (ref 16–34)
ECHO LA VOLUME AREA LENGTH: 68 ML
ECHO LA VOLUME INDEX A-L A2C: 35 ML/M2 (ref 16–34)
ECHO LA VOLUME INDEX A-L A4C: 38 ML/M2 (ref 16–34)
ECHO LA VOLUME INDEX AREA LENGTH: 37 ML/M2 (ref 16–34)
ECHO LA VOLUME INDEX MOD A2C: 32 ML/M2 (ref 16–34)
ECHO LA VOLUME INDEX MOD A4C: 37 ML/M2 (ref 16–34)
ECHO LV E' LATERAL VELOCITY: 8.11 CM/S
ECHO LV E' SEPTAL VELOCITY: 4.95 CM/S
ECHO LV EDV A2C: 126 ML
ECHO LV EDV A4C: 151 ML
ECHO LV EDV BP: 142 ML (ref 56–104)
ECHO LV EDV INDEX A4C: 83 ML/M2
ECHO LV EDV INDEX BP: 78 ML/M2
ECHO LV EDV NDEX A2C: 69 ML/M2
ECHO LV EJECTION FRACTION A2C: 59 %
ECHO LV EJECTION FRACTION A4C: 55 %
ECHO LV EJECTION FRACTION BIPLANE: 58 % (ref 55–100)
ECHO LV ESV A2C: 52 ML
ECHO LV ESV A4C: 68 ML
ECHO LV ESV BP: 60 ML (ref 19–49)
ECHO LV ESV INDEX A2C: 29 ML/M2
ECHO LV ESV INDEX A4C: 37 ML/M2
ECHO LV ESV INDEX BP: 33 ML/M2
ECHO LV FRACTIONAL SHORTENING: 30 % (ref 28–44)
ECHO LV INTERNAL DIMENSION DIASTOLE INDEX: 2.2 CM/M2
ECHO LV INTERNAL DIMENSION DIASTOLIC: 4 CM (ref 3.9–5.3)
ECHO LV INTERNAL DIMENSION SYSTOLIC INDEX: 1.54 CM/M2
ECHO LV INTERNAL DIMENSION SYSTOLIC: 2.8 CM
ECHO LV IVSD: 1.5 CM (ref 0.6–0.9)
ECHO LV MASS 2D: 209 G (ref 67–162)
ECHO LV MASS INDEX 2D: 114.8 G/M2 (ref 43–95)
ECHO LV POSTERIOR WALL DIASTOLIC: 1.3 CM (ref 0.6–0.9)
ECHO LV RELATIVE WALL THICKNESS RATIO: 0.65
ECHO LVOT AREA: 2.5 CM2
ECHO LVOT AV VTI INDEX: 0.63
ECHO LVOT DIAM: 1.8 CM
ECHO LVOT MEAN GRADIENT: 5 MMHG
ECHO LVOT PEAK GRADIENT: 8 MMHG
ECHO LVOT PEAK VELOCITY: 1.4 M/S
ECHO LVOT STROKE VOLUME INDEX: 43.5 ML/M2
ECHO LVOT SV: 79.1 ML
ECHO LVOT VTI: 31.1 CM
ECHO MV A VELOCITY: 1.77 M/S
ECHO MV AREA VTI: 1.8 CM2
ECHO MV E DECELERATION TIME (DT): 229.6 MS
ECHO MV E VELOCITY: 1.09 M/S
ECHO MV E/A RATIO: 0.62
ECHO MV E/E' LATERAL: 13.44
ECHO MV E/E' RATIO (AVERAGED): 17.73
ECHO MV E/E' SEPTAL: 22.02
ECHO MV LVOT VTI INDEX: 1.41
ECHO MV MAX VELOCITY: 1.9 M/S
ECHO MV MEAN GRADIENT: 7 MMHG
ECHO MV MEAN VELOCITY: 1.2 M/S
ECHO MV PEAK GRADIENT: 15 MMHG
ECHO MV VTI: 43.8 CM
ECHO PULMONARY ARTERY SYSTOLIC PRESSURE (PASP): 29 MMHG
ECHO RA END SYSTOLIC VOLUME APICAL 4 CHAMBER INDEX BSA: 9 ML/M2
ECHO RA VOLUME: 16 ML
ECHO RIGHT VENTRICULAR SYSTOLIC PRESSURE (RVSP): 29 MMHG
ECHO RV FREE WALL PEAK S': 16.1 CM/S
ECHO RV INTERNAL DIMENSION: 3.3 CM
ECHO RV TAPSE: 2.2 CM (ref 1.7–?)
ECHO RVOT MEAN GRADIENT: 1 MMHG
ECHO RVOT PEAK GRADIENT: 2 MMHG
ECHO RVOT PEAK VELOCITY: 0.6 M/S
ECHO RVOT VTI: 13.1 CM
ECHO TV REGURGITANT MAX VELOCITY: 2.53 M/S
ECHO TV REGURGITANT PEAK GRADIENT: 26 MMHG
GLUCOSE BLD STRIP.AUTO-MCNC: 179 MG/DL (ref 70–110)
GLUCOSE BLD STRIP.AUTO-MCNC: 183 MG/DL (ref 70–110)
GLUCOSE BLD STRIP.AUTO-MCNC: 186 MG/DL (ref 70–110)
GLUCOSE BLD STRIP.AUTO-MCNC: 199 MG/DL (ref 70–110)
GLUCOSE BLD STRIP.AUTO-MCNC: 199 MG/DL (ref 70–110)
PROCALCITONIN SERPL-MCNC: <0.05 NG/ML
SERVICE CMNT-IMP: ABNORMAL

## 2025-01-01 PROCEDURE — 2500000003 HC RX 250 WO HCPCS: Performed by: STUDENT IN AN ORGANIZED HEALTH CARE EDUCATION/TRAINING PROGRAM

## 2025-01-01 PROCEDURE — 2500000003 HC RX 250 WO HCPCS: Performed by: INTERNAL MEDICINE

## 2025-01-01 PROCEDURE — 6360000002 HC RX W HCPCS: Performed by: INTERNAL MEDICINE

## 2025-01-01 PROCEDURE — 82962 GLUCOSE BLOOD TEST: CPT

## 2025-01-01 PROCEDURE — 6370000000 HC RX 637 (ALT 250 FOR IP): Performed by: HOSPITALIST

## 2025-01-01 PROCEDURE — 6360000002 HC RX W HCPCS: Performed by: HOSPITALIST

## 2025-01-01 PROCEDURE — 6360000002 HC RX W HCPCS: Performed by: STUDENT IN AN ORGANIZED HEALTH CARE EDUCATION/TRAINING PROGRAM

## 2025-01-01 PROCEDURE — 73700 CT LOWER EXTREMITY W/O DYE: CPT

## 2025-01-01 PROCEDURE — 6370000000 HC RX 637 (ALT 250 FOR IP): Performed by: STUDENT IN AN ORGANIZED HEALTH CARE EDUCATION/TRAINING PROGRAM

## 2025-01-01 PROCEDURE — 1100000000 HC RM PRIVATE

## 2025-01-01 PROCEDURE — 36415 COLL VENOUS BLD VENIPUNCTURE: CPT

## 2025-01-01 PROCEDURE — 84145 PROCALCITONIN (PCT): CPT

## 2025-01-01 RX ORDER — DOCUSATE SODIUM 100 MG/1
100 CAPSULE, LIQUID FILLED ORAL 2 TIMES DAILY
Status: DISCONTINUED | OUTPATIENT
Start: 2025-01-01 | End: 2025-01-05 | Stop reason: HOSPADM

## 2025-01-01 RX ORDER — HYDROCHLOROTHIAZIDE 25 MG/1
12.5 TABLET ORAL DAILY
Status: DISCONTINUED | OUTPATIENT
Start: 2025-01-01 | End: 2025-01-05 | Stop reason: HOSPADM

## 2025-01-01 RX ORDER — LACTULOSE 10 G/15ML
20 SOLUTION ORAL 3 TIMES DAILY
Status: COMPLETED | OUTPATIENT
Start: 2025-01-01 | End: 2025-01-01

## 2025-01-01 RX ADMIN — HYDRALAZINE HYDROCHLORIDE 20 MG: 20 INJECTION INTRAMUSCULAR; INTRAVENOUS at 05:49

## 2025-01-01 RX ADMIN — LACTULOSE 20 G: 10 SOLUTION ORAL at 18:33

## 2025-01-01 RX ADMIN — HYDRALAZINE HYDROCHLORIDE 20 MG: 20 INJECTION INTRAMUSCULAR; INTRAVENOUS at 16:34

## 2025-01-01 RX ADMIN — KETOROLAC TROMETHAMINE 15 MG: 15 INJECTION, SOLUTION INTRAMUSCULAR; INTRAVENOUS at 12:31

## 2025-01-01 RX ADMIN — WATER 2000 MG: 1 INJECTION INTRAMUSCULAR; INTRAVENOUS; SUBCUTANEOUS at 22:30

## 2025-01-01 RX ADMIN — AMLODIPINE BESYLATE 10 MG: 5 TABLET ORAL at 08:45

## 2025-01-01 RX ADMIN — DOCUSATE SODIUM 100 MG: 100 CAPSULE, LIQUID FILLED ORAL at 12:11

## 2025-01-01 RX ADMIN — LACTULOSE 20 G: 10 SOLUTION ORAL at 20:38

## 2025-01-01 RX ADMIN — HYDROMORPHONE HYDROCHLORIDE 0.5 MG: 1 INJECTION, SOLUTION INTRAMUSCULAR; INTRAVENOUS; SUBCUTANEOUS at 00:19

## 2025-01-01 RX ADMIN — CARVEDILOL 12.5 MG: 12.5 TABLET, FILM COATED ORAL at 08:45

## 2025-01-01 RX ADMIN — KETOROLAC TROMETHAMINE 15 MG: 15 INJECTION, SOLUTION INTRAMUSCULAR; INTRAVENOUS at 05:49

## 2025-01-01 RX ADMIN — LATANOPROST 1 DROP: 50 SOLUTION OPHTHALMIC at 20:52

## 2025-01-01 RX ADMIN — HYDROMORPHONE HYDROCHLORIDE 0.5 MG: 1 INJECTION, SOLUTION INTRAMUSCULAR; INTRAVENOUS; SUBCUTANEOUS at 08:41

## 2025-01-01 RX ADMIN — BRIMONIDINE TARTRATE 1 DROP: 2 SOLUTION OPHTHALMIC at 09:52

## 2025-01-01 RX ADMIN — WATER 2000 MG: 1 INJECTION INTRAMUSCULAR; INTRAVENOUS; SUBCUTANEOUS at 16:02

## 2025-01-01 RX ADMIN — BRIMONIDINE TARTRATE 1 DROP: 2 SOLUTION OPHTHALMIC at 20:45

## 2025-01-01 RX ADMIN — INSULIN LISPRO 2 UNITS: 100 INJECTION, SOLUTION INTRAVENOUS; SUBCUTANEOUS at 12:12

## 2025-01-01 RX ADMIN — ENOXAPARIN SODIUM 40 MG: 100 INJECTION SUBCUTANEOUS at 09:51

## 2025-01-01 RX ADMIN — HYDROMORPHONE HYDROCHLORIDE 0.5 MG: 1 INJECTION, SOLUTION INTRAMUSCULAR; INTRAVENOUS; SUBCUTANEOUS at 22:37

## 2025-01-01 RX ADMIN — CARVEDILOL 12.5 MG: 12.5 TABLET, FILM COATED ORAL at 20:38

## 2025-01-01 RX ADMIN — LACTULOSE 20 G: 10 SOLUTION ORAL at 12:11

## 2025-01-01 RX ADMIN — TRAMADOL HYDROCHLORIDE 75 MG: 50 TABLET, COATED ORAL at 16:01

## 2025-01-01 RX ADMIN — SODIUM CHLORIDE, PRESERVATIVE FREE 10 ML: 5 INJECTION INTRAVENOUS at 20:42

## 2025-01-01 RX ADMIN — DORZOLAMIDE HYDROCHLORIDE AND TIMOLOL MALEATE 1 DROP: 20; 5 SOLUTION/ DROPS OPHTHALMIC at 20:45

## 2025-01-01 RX ADMIN — DOCUSATE SODIUM 100 MG: 100 CAPSULE, LIQUID FILLED ORAL at 20:38

## 2025-01-01 RX ADMIN — HYDROCHLOROTHIAZIDE 12.5 MG: 25 TABLET ORAL at 12:11

## 2025-01-01 RX ADMIN — BRIMONIDINE TARTRATE 1 DROP: 2 SOLUTION OPHTHALMIC at 16:02

## 2025-01-01 RX ADMIN — TRAMADOL HYDROCHLORIDE 75 MG: 50 TABLET, COATED ORAL at 05:49

## 2025-01-01 RX ADMIN — HYDROMORPHONE HYDROCHLORIDE 0.5 MG: 1 INJECTION, SOLUTION INTRAMUSCULAR; INTRAVENOUS; SUBCUTANEOUS at 17:36

## 2025-01-01 RX ADMIN — PREDNISOLONE ACETATE 1 DROP: 10 SUSPENSION/ DROPS OPHTHALMIC at 09:52

## 2025-01-01 RX ADMIN — INSULIN LISPRO 2 UNITS: 100 INJECTION, SOLUTION INTRAVENOUS; SUBCUTANEOUS at 08:51

## 2025-01-01 RX ADMIN — KETOROLAC TROMETHAMINE 15 MG: 15 INJECTION, SOLUTION INTRAMUSCULAR; INTRAVENOUS at 20:38

## 2025-01-01 RX ADMIN — ROSUVASTATIN CALCIUM 20 MG: 10 TABLET, FILM COATED ORAL at 08:45

## 2025-01-01 RX ADMIN — GABAPENTIN 100 MG: 100 CAPSULE ORAL at 08:45

## 2025-01-01 RX ADMIN — DORZOLAMIDE HYDROCHLORIDE AND TIMOLOL MALEATE 1 DROP: 20; 5 SOLUTION/ DROPS OPHTHALMIC at 09:51

## 2025-01-01 RX ADMIN — WATER 2000 MG: 1 INJECTION INTRAMUSCULAR; INTRAVENOUS; SUBCUTANEOUS at 05:50

## 2025-01-01 RX ADMIN — SODIUM CHLORIDE, PRESERVATIVE FREE 10 ML: 5 INJECTION INTRAVENOUS at 09:52

## 2025-01-01 RX ADMIN — PREDNISOLONE ACETATE 1 DROP: 10 SUSPENSION/ DROPS OPHTHALMIC at 20:44

## 2025-01-01 RX ADMIN — PREDNISOLONE ACETATE 1 DROP: 10 SUSPENSION/ DROPS OPHTHALMIC at 16:02

## 2025-01-01 RX ADMIN — HYDRALAZINE HYDROCHLORIDE 20 MG: 20 INJECTION INTRAMUSCULAR; INTRAVENOUS at 22:37

## 2025-01-01 ASSESSMENT — PAIN SCALES - GENERAL
PAINLEVEL_OUTOF10: 8
PAINLEVEL_OUTOF10: 10
PAINLEVEL_OUTOF10: 8
PAINLEVEL_OUTOF10: 5
PAINLEVEL_OUTOF10: 10
PAINLEVEL_OUTOF10: 10
PAINLEVEL_OUTOF10: 9
PAINLEVEL_OUTOF10: 9
PAINLEVEL_OUTOF10: 0
PAINLEVEL_OUTOF10: 4
PAINLEVEL_OUTOF10: 8
PAINLEVEL_OUTOF10: 8
PAINLEVEL_OUTOF10: 9
PAINLEVEL_OUTOF10: 6
PAINLEVEL_OUTOF10: 8

## 2025-01-01 ASSESSMENT — PAIN DESCRIPTION - LOCATION
LOCATION: OTHER (COMMENT)
LOCATION: LEG
LOCATION: OTHER (COMMENT)
LOCATION: LEG
LOCATION: GENERALIZED
LOCATION: HAND
LOCATION: HAND

## 2025-01-01 ASSESSMENT — PAIN DESCRIPTION - DESCRIPTORS
DESCRIPTORS: BURNING;ACHING;PRESSURE
DESCRIPTORS: ACHING;PRESSURE
DESCRIPTORS: ACHING
DESCRIPTORS: THROBBING
DESCRIPTORS: THROBBING

## 2025-01-01 ASSESSMENT — PAIN DESCRIPTION - ORIENTATION
ORIENTATION: LEFT;RIGHT
ORIENTATION: LEFT;RIGHT
ORIENTATION: RIGHT;LEFT
ORIENTATION: RIGHT;LEFT;LOWER;UPPER
ORIENTATION: LEFT;RIGHT
ORIENTATION: RIGHT;LEFT

## 2025-01-01 NOTE — CARE COORDINATION
Pt discussed in IDR. Auth for SNF to start on 1/2  Pt blood pressure elevated-     SPENCER Hamilton, Supervisee in Social Work  Inpatient Case Mananger

## 2025-01-02 LAB
AMPHET UR QL SCN: NEGATIVE
BARBITURATES UR QL SCN: NEGATIVE
BENZODIAZ UR QL: NEGATIVE
CANNABINOIDS UR QL SCN: NEGATIVE
COCAINE UR QL SCN: NEGATIVE
GLUCOSE BLD STRIP.AUTO-MCNC: 179 MG/DL (ref 70–110)
GLUCOSE BLD STRIP.AUTO-MCNC: 243 MG/DL (ref 70–110)
GLUCOSE BLD STRIP.AUTO-MCNC: 289 MG/DL (ref 70–110)
GLUCOSE BLD STRIP.AUTO-MCNC: 367 MG/DL (ref 70–110)
GLUCOSE BLD STRIP.AUTO-MCNC: 376 MG/DL (ref 70–110)
HCV GENTYP SERPL NAA+PROBE: NORMAL
HCV RNA SERPL NAA+PROBE-ACNC: NORMAL IU/ML
HCV RNA SERPL NAA+PROBE-LOG IU: NORMAL LOG10 IU/ML
LABORATORY COMMENT REPORT: NORMAL
Lab: ABNORMAL
METHADONE UR QL: NEGATIVE
OPIATES UR QL: POSITIVE
PCP UR QL: NEGATIVE

## 2025-01-02 PROCEDURE — 80307 DRUG TEST PRSMV CHEM ANLYZR: CPT

## 2025-01-02 PROCEDURE — 6360000002 HC RX W HCPCS: Performed by: STUDENT IN AN ORGANIZED HEALTH CARE EDUCATION/TRAINING PROGRAM

## 2025-01-02 PROCEDURE — 6370000000 HC RX 637 (ALT 250 FOR IP): Performed by: STUDENT IN AN ORGANIZED HEALTH CARE EDUCATION/TRAINING PROGRAM

## 2025-01-02 PROCEDURE — 1100000000 HC RM PRIVATE

## 2025-01-02 PROCEDURE — 97535 SELF CARE MNGMENT TRAINING: CPT

## 2025-01-02 PROCEDURE — 6360000002 HC RX W HCPCS: Performed by: INTERNAL MEDICINE

## 2025-01-02 PROCEDURE — 97530 THERAPEUTIC ACTIVITIES: CPT

## 2025-01-02 PROCEDURE — 82962 GLUCOSE BLOOD TEST: CPT

## 2025-01-02 PROCEDURE — 2500000003 HC RX 250 WO HCPCS: Performed by: INTERNAL MEDICINE

## 2025-01-02 PROCEDURE — 2500000003 HC RX 250 WO HCPCS: Performed by: STUDENT IN AN ORGANIZED HEALTH CARE EDUCATION/TRAINING PROGRAM

## 2025-01-02 PROCEDURE — 97110 THERAPEUTIC EXERCISES: CPT

## 2025-01-02 PROCEDURE — 6370000000 HC RX 637 (ALT 250 FOR IP): Performed by: HOSPITALIST

## 2025-01-02 RX ADMIN — DORZOLAMIDE HYDROCHLORIDE AND TIMOLOL MALEATE 1 DROP: 20; 5 SOLUTION/ DROPS OPHTHALMIC at 19:55

## 2025-01-02 RX ADMIN — TRAMADOL HYDROCHLORIDE 75 MG: 50 TABLET, COATED ORAL at 09:44

## 2025-01-02 RX ADMIN — WATER 2000 MG: 1 INJECTION INTRAMUSCULAR; INTRAVENOUS; SUBCUTANEOUS at 06:38

## 2025-01-02 RX ADMIN — AMLODIPINE BESYLATE 10 MG: 5 TABLET ORAL at 08:37

## 2025-01-02 RX ADMIN — INSULIN LISPRO 2 UNITS: 100 INJECTION, SOLUTION INTRAVENOUS; SUBCUTANEOUS at 11:47

## 2025-01-02 RX ADMIN — HYDROCHLOROTHIAZIDE 12.5 MG: 25 TABLET ORAL at 08:38

## 2025-01-02 RX ADMIN — CARVEDILOL 12.5 MG: 12.5 TABLET, FILM COATED ORAL at 19:49

## 2025-01-02 RX ADMIN — INSULIN LISPRO 8 UNITS: 100 INJECTION, SOLUTION INTRAVENOUS; SUBCUTANEOUS at 17:49

## 2025-01-02 RX ADMIN — BRIMONIDINE TARTRATE 1 DROP: 2 SOLUTION OPHTHALMIC at 08:44

## 2025-01-02 RX ADMIN — PREDNISOLONE ACETATE 1 DROP: 10 SUSPENSION/ DROPS OPHTHALMIC at 17:49

## 2025-01-02 RX ADMIN — WATER 2000 MG: 1 INJECTION INTRAMUSCULAR; INTRAVENOUS; SUBCUTANEOUS at 13:11

## 2025-01-02 RX ADMIN — PREDNISOLONE ACETATE 1 DROP: 10 SUSPENSION/ DROPS OPHTHALMIC at 19:55

## 2025-01-02 RX ADMIN — DOCUSATE SODIUM 100 MG: 100 CAPSULE, LIQUID FILLED ORAL at 08:38

## 2025-01-02 RX ADMIN — INSULIN LISPRO 4 UNITS: 100 INJECTION, SOLUTION INTRAVENOUS; SUBCUTANEOUS at 19:52

## 2025-01-02 RX ADMIN — CARVEDILOL 12.5 MG: 12.5 TABLET, FILM COATED ORAL at 08:37

## 2025-01-02 RX ADMIN — GABAPENTIN 100 MG: 100 CAPSULE ORAL at 08:38

## 2025-01-02 RX ADMIN — LATANOPROST 1 DROP: 50 SOLUTION OPHTHALMIC at 19:59

## 2025-01-02 RX ADMIN — SODIUM CHLORIDE, PRESERVATIVE FREE 10 ML: 5 INJECTION INTRAVENOUS at 19:53

## 2025-01-02 RX ADMIN — ENOXAPARIN SODIUM 40 MG: 100 INJECTION SUBCUTANEOUS at 10:04

## 2025-01-02 RX ADMIN — TRAMADOL HYDROCHLORIDE 75 MG: 50 TABLET, COATED ORAL at 19:48

## 2025-01-02 RX ADMIN — ONDANSETRON 4 MG: 2 INJECTION INTRAMUSCULAR; INTRAVENOUS at 08:37

## 2025-01-02 RX ADMIN — PREDNISOLONE ACETATE 1 DROP: 10 SUSPENSION/ DROPS OPHTHALMIC at 08:44

## 2025-01-02 RX ADMIN — ONDANSETRON 4 MG: 2 INJECTION INTRAMUSCULAR; INTRAVENOUS at 13:49

## 2025-01-02 RX ADMIN — BRIMONIDINE TARTRATE 1 DROP: 2 SOLUTION OPHTHALMIC at 19:55

## 2025-01-02 RX ADMIN — SODIUM CHLORIDE, PRESERVATIVE FREE 10 ML: 5 INJECTION INTRAVENOUS at 09:22

## 2025-01-02 RX ADMIN — ROSUVASTATIN CALCIUM 20 MG: 10 TABLET, FILM COATED ORAL at 08:37

## 2025-01-02 RX ADMIN — SODIUM CHLORIDE, PRESERVATIVE FREE 10 ML: 5 INJECTION INTRAVENOUS at 08:45

## 2025-01-02 RX ADMIN — PREDNISOLONE ACETATE 1 DROP: 10 SUSPENSION/ DROPS OPHTHALMIC at 13:18

## 2025-01-02 RX ADMIN — TRAMADOL HYDROCHLORIDE 75 MG: 50 TABLET, COATED ORAL at 03:38

## 2025-01-02 RX ADMIN — BRIMONIDINE TARTRATE 1 DROP: 2 SOLUTION OPHTHALMIC at 13:19

## 2025-01-02 RX ADMIN — DORZOLAMIDE HYDROCHLORIDE AND TIMOLOL MALEATE 1 DROP: 20; 5 SOLUTION/ DROPS OPHTHALMIC at 08:44

## 2025-01-02 ASSESSMENT — PAIN SCALES - GENERAL
PAINLEVEL_OUTOF10: 7
PAINLEVEL_OUTOF10: 4
PAINLEVEL_OUTOF10: 7
PAINLEVEL_OUTOF10: 6
PAINLEVEL_OUTOF10: 6

## 2025-01-02 ASSESSMENT — PAIN DESCRIPTION - LOCATION
LOCATION: GENERALIZED
LOCATION: LEG;HAND

## 2025-01-02 ASSESSMENT — PAIN DESCRIPTION - DESCRIPTORS
DESCRIPTORS: ACHING
DESCRIPTORS: ACHING

## 2025-01-02 ASSESSMENT — PAIN DESCRIPTION - ORIENTATION: ORIENTATION: RIGHT;LEFT

## 2025-01-03 LAB
ANION GAP SERPL CALC-SCNC: 3 MMOL/L (ref 3–18)
BACTERIA SPEC CULT: NORMAL
BACTERIA SPEC CULT: NORMAL
BUN SERPL-MCNC: 18 MG/DL (ref 7–18)
BUN/CREAT SERPL: 12 (ref 12–20)
CALCIUM SERPL-MCNC: 9 MG/DL (ref 8.5–10.1)
CHLORIDE SERPL-SCNC: 97 MMOL/L (ref 100–111)
CO2 SERPL-SCNC: 31 MMOL/L (ref 21–32)
CREAT SERPL-MCNC: 1.47 MG/DL (ref 0.6–1.3)
ERYTHROCYTE [DISTWIDTH] IN BLOOD BY AUTOMATED COUNT: 18.3 % (ref 11.6–14.5)
GLUCOSE BLD STRIP.AUTO-MCNC: 198 MG/DL (ref 70–110)
GLUCOSE BLD STRIP.AUTO-MCNC: 219 MG/DL (ref 70–110)
GLUCOSE BLD STRIP.AUTO-MCNC: 239 MG/DL (ref 70–110)
GLUCOSE BLD STRIP.AUTO-MCNC: 267 MG/DL (ref 70–110)
GLUCOSE SERPL-MCNC: 187 MG/DL (ref 74–99)
HCT VFR BLD AUTO: 37.9 % (ref 35–45)
HGB BLD-MCNC: 11.3 G/DL (ref 12–16)
MAGNESIUM SERPL-MCNC: 2.1 MG/DL (ref 1.6–2.6)
MCH RBC QN AUTO: 23.8 PG (ref 24–34)
MCHC RBC AUTO-ENTMCNC: 29.8 G/DL (ref 31–37)
MCV RBC AUTO: 79.8 FL (ref 78–100)
NRBC # BLD: 0 K/UL (ref 0–0.01)
NRBC BLD-RTO: 0 PER 100 WBC
PLATELET # BLD AUTO: 206 K/UL (ref 135–420)
PMV BLD AUTO: 10.8 FL (ref 9.2–11.8)
POTASSIUM SERPL-SCNC: 3.4 MMOL/L (ref 3.5–5.5)
RBC # BLD AUTO: 4.75 M/UL (ref 4.2–5.3)
SERVICE CMNT-IMP: NORMAL
SERVICE CMNT-IMP: NORMAL
SODIUM SERPL-SCNC: 131 MMOL/L (ref 136–145)
WBC # BLD AUTO: 7.7 K/UL (ref 4.6–13.2)

## 2025-01-03 PROCEDURE — 6370000000 HC RX 637 (ALT 250 FOR IP): Performed by: HOSPITALIST

## 2025-01-03 PROCEDURE — 6370000000 HC RX 637 (ALT 250 FOR IP): Performed by: STUDENT IN AN ORGANIZED HEALTH CARE EDUCATION/TRAINING PROGRAM

## 2025-01-03 PROCEDURE — 6360000002 HC RX W HCPCS: Performed by: INTERNAL MEDICINE

## 2025-01-03 PROCEDURE — 6360000002 HC RX W HCPCS: Performed by: STUDENT IN AN ORGANIZED HEALTH CARE EDUCATION/TRAINING PROGRAM

## 2025-01-03 PROCEDURE — 1100000000 HC RM PRIVATE

## 2025-01-03 PROCEDURE — 83735 ASSAY OF MAGNESIUM: CPT

## 2025-01-03 PROCEDURE — 85027 COMPLETE CBC AUTOMATED: CPT

## 2025-01-03 PROCEDURE — 82962 GLUCOSE BLOOD TEST: CPT

## 2025-01-03 PROCEDURE — 36415 COLL VENOUS BLD VENIPUNCTURE: CPT

## 2025-01-03 PROCEDURE — 2500000003 HC RX 250 WO HCPCS: Performed by: INTERNAL MEDICINE

## 2025-01-03 PROCEDURE — 80048 BASIC METABOLIC PNL TOTAL CA: CPT

## 2025-01-03 PROCEDURE — 2500000003 HC RX 250 WO HCPCS: Performed by: STUDENT IN AN ORGANIZED HEALTH CARE EDUCATION/TRAINING PROGRAM

## 2025-01-03 RX ORDER — POTASSIUM CHLORIDE 1500 MG/1
40 TABLET, EXTENDED RELEASE ORAL 2 TIMES DAILY
Status: COMPLETED | OUTPATIENT
Start: 2025-01-03 | End: 2025-01-03

## 2025-01-03 RX ORDER — MINERAL OIL AND WHITE PETROLATUM 150; 830 MG/G; MG/G
OINTMENT OPHTHALMIC PRN
Status: DISCONTINUED | OUTPATIENT
Start: 2025-01-03 | End: 2025-01-05 | Stop reason: HOSPADM

## 2025-01-03 RX ORDER — POTASSIUM CHLORIDE 1500 MG/1
40 TABLET, EXTENDED RELEASE ORAL ONCE
Status: COMPLETED | OUTPATIENT
Start: 2025-01-03 | End: 2025-01-03

## 2025-01-03 RX ADMIN — ACETAMINOPHEN 1000 MG: 500 TABLET ORAL at 23:52

## 2025-01-03 RX ADMIN — INSULIN LISPRO 4 UNITS: 100 INJECTION, SOLUTION INTRAVENOUS; SUBCUTANEOUS at 20:13

## 2025-01-03 RX ADMIN — TRAMADOL HYDROCHLORIDE 75 MG: 50 TABLET, COATED ORAL at 21:01

## 2025-01-03 RX ADMIN — POTASSIUM CHLORIDE 40 MEQ: 1500 TABLET, EXTENDED RELEASE ORAL at 15:03

## 2025-01-03 RX ADMIN — TRAMADOL HYDROCHLORIDE 75 MG: 50 TABLET, COATED ORAL at 02:10

## 2025-01-03 RX ADMIN — WATER 2000 MG: 1 INJECTION INTRAMUSCULAR; INTRAVENOUS; SUBCUTANEOUS at 00:36

## 2025-01-03 RX ADMIN — PREDNISOLONE ACETATE 1 DROP: 10 SUSPENSION/ DROPS OPHTHALMIC at 20:15

## 2025-01-03 RX ADMIN — POTASSIUM CHLORIDE 40 MEQ: 1500 TABLET, EXTENDED RELEASE ORAL at 20:30

## 2025-01-03 RX ADMIN — DORZOLAMIDE HYDROCHLORIDE AND TIMOLOL MALEATE 1 DROP: 20; 5 SOLUTION/ DROPS OPHTHALMIC at 09:29

## 2025-01-03 RX ADMIN — HYDRALAZINE HYDROCHLORIDE 20 MG: 20 INJECTION INTRAMUSCULAR; INTRAVENOUS at 00:04

## 2025-01-03 RX ADMIN — CARVEDILOL 12.5 MG: 12.5 TABLET, FILM COATED ORAL at 09:29

## 2025-01-03 RX ADMIN — TRAMADOL HYDROCHLORIDE 75 MG: 50 TABLET, COATED ORAL at 11:02

## 2025-01-03 RX ADMIN — CARVEDILOL 12.5 MG: 12.5 TABLET, FILM COATED ORAL at 20:30

## 2025-01-03 RX ADMIN — AMLODIPINE BESYLATE 10 MG: 5 TABLET ORAL at 09:29

## 2025-01-03 RX ADMIN — BRIMONIDINE TARTRATE 1 DROP: 2 SOLUTION OPHTHALMIC at 15:04

## 2025-01-03 RX ADMIN — INSULIN LISPRO 2 UNITS: 100 INJECTION, SOLUTION INTRAVENOUS; SUBCUTANEOUS at 07:05

## 2025-01-03 RX ADMIN — POTASSIUM CHLORIDE 40 MEQ: 1500 TABLET, EXTENDED RELEASE ORAL at 07:04

## 2025-01-03 RX ADMIN — BRIMONIDINE TARTRATE 1 DROP: 2 SOLUTION OPHTHALMIC at 09:29

## 2025-01-03 RX ADMIN — SODIUM CHLORIDE, PRESERVATIVE FREE 10 ML: 5 INJECTION INTRAVENOUS at 09:29

## 2025-01-03 RX ADMIN — PREDNISOLONE ACETATE 1 DROP: 10 SUSPENSION/ DROPS OPHTHALMIC at 09:29

## 2025-01-03 RX ADMIN — DOCUSATE SODIUM 100 MG: 100 CAPSULE, LIQUID FILLED ORAL at 09:29

## 2025-01-03 RX ADMIN — ENOXAPARIN SODIUM 40 MG: 100 INJECTION SUBCUTANEOUS at 09:29

## 2025-01-03 RX ADMIN — HYDROCHLOROTHIAZIDE 12.5 MG: 25 TABLET ORAL at 09:28

## 2025-01-03 RX ADMIN — ROSUVASTATIN CALCIUM 20 MG: 10 TABLET, FILM COATED ORAL at 09:29

## 2025-01-03 RX ADMIN — INSULIN LISPRO 2 UNITS: 100 INJECTION, SOLUTION INTRAVENOUS; SUBCUTANEOUS at 17:20

## 2025-01-03 RX ADMIN — BRIMONIDINE TARTRATE 1 DROP: 2 SOLUTION OPHTHALMIC at 20:37

## 2025-01-03 RX ADMIN — DORZOLAMIDE HYDROCHLORIDE AND TIMOLOL MALEATE 1 DROP: 20; 5 SOLUTION/ DROPS OPHTHALMIC at 20:27

## 2025-01-03 RX ADMIN — LATANOPROST 1 DROP: 50 SOLUTION OPHTHALMIC at 20:52

## 2025-01-03 RX ADMIN — GABAPENTIN 100 MG: 100 CAPSULE ORAL at 09:28

## 2025-01-03 RX ADMIN — ACETAMINOPHEN 1000 MG: 500 TABLET ORAL at 00:19

## 2025-01-03 RX ADMIN — PREDNISOLONE ACETATE 1 DROP: 10 SUSPENSION/ DROPS OPHTHALMIC at 17:21

## 2025-01-03 RX ADMIN — PREDNISOLONE ACETATE 1 DROP: 10 SUSPENSION/ DROPS OPHTHALMIC at 13:32

## 2025-01-03 RX ADMIN — INSULIN LISPRO 2 UNITS: 100 INJECTION, SOLUTION INTRAVENOUS; SUBCUTANEOUS at 12:03

## 2025-01-03 ASSESSMENT — PAIN SCALES - GENERAL
PAINLEVEL_OUTOF10: 8
PAINLEVEL_OUTOF10: 0
PAINLEVEL_OUTOF10: 7
PAINLEVEL_OUTOF10: 7
PAINLEVEL_OUTOF10: 6

## 2025-01-03 ASSESSMENT — PAIN DESCRIPTION - ORIENTATION
ORIENTATION: LEFT;RIGHT
ORIENTATION: RIGHT;LEFT

## 2025-01-03 ASSESSMENT — PAIN SCALES - WONG BAKER: WONGBAKER_NUMERICALRESPONSE: NO HURT

## 2025-01-03 ASSESSMENT — PAIN DESCRIPTION - PAIN TYPE: TYPE: OTHER (COMMENT)

## 2025-01-03 ASSESSMENT — PAIN DESCRIPTION - DESCRIPTORS
DESCRIPTORS: ACHING
DESCRIPTORS: ACHING

## 2025-01-03 NOTE — CARE COORDINATION
ARI spoke to [place] [phone] with [facility] who reported pts auth remains in pending status.    ARI will continue to follow up.    ADDENDUM @ 10:54am    ARI spoke to Pilar with [facility] who stated she anticipates a possible peer to peer. ARI informed the MD during IDRs. Pt was living alone and independent with ADLs prior to admission.    Pt with no accepting HH agency at this time. ARI expanded HH referrals in the event pts auth is denied.     SPENCER Shen  Case Management Department

## 2025-01-03 NOTE — CARE COORDINATION
Medicare pt has received, reviewed, and signed 2nd Important Message from Medicare letter informing them of their right to appeal the discharge.  Signed copy has been placed on pt bedside chart.  Patient acknowledged understanding of letter and right to appeal.  Copy of letter left at patient's bedside as requested.

## 2025-01-03 NOTE — CARE COORDINATION
SW received call from [place] with [facility]. Pts auth is approved with a start date of tomorrow. Per Pilar they can accept tomorrow. SW placed transport on will call for tomorrow with [facility] [phone]. SW informed the attending MD.    PCS placed in envelope on hard chart. Pt made aware of plan.      [facility]     Basic Information     Address:  [address] [place] [address]  [facility]      SPENCER Shen  Case Management Department

## 2025-01-04 ENCOUNTER — APPOINTMENT (OUTPATIENT)
Facility: HOSPITAL | Age: 75
DRG: 690 | End: 2025-01-04
Payer: MEDICARE

## 2025-01-04 LAB
ANION GAP SERPL CALC-SCNC: 4 MMOL/L (ref 3–18)
BUN SERPL-MCNC: 16 MG/DL (ref 7–18)
BUN/CREAT SERPL: 14 (ref 12–20)
CALCIUM SERPL-MCNC: 8.9 MG/DL (ref 8.5–10.1)
CHLORIDE SERPL-SCNC: 98 MMOL/L (ref 100–111)
CO2 SERPL-SCNC: 32 MMOL/L (ref 21–32)
CREAT SERPL-MCNC: 1.16 MG/DL (ref 0.6–1.3)
GLUCOSE BLD STRIP.AUTO-MCNC: 175 MG/DL (ref 70–110)
GLUCOSE BLD STRIP.AUTO-MCNC: 200 MG/DL (ref 70–110)
GLUCOSE BLD STRIP.AUTO-MCNC: 205 MG/DL (ref 70–110)
GLUCOSE BLD STRIP.AUTO-MCNC: 288 MG/DL (ref 70–110)
GLUCOSE SERPL-MCNC: 163 MG/DL (ref 74–99)
POTASSIUM SERPL-SCNC: 4.6 MMOL/L (ref 3.5–5.5)
SODIUM SERPL-SCNC: 134 MMOL/L (ref 136–145)

## 2025-01-04 PROCEDURE — 80048 BASIC METABOLIC PNL TOTAL CA: CPT

## 2025-01-04 PROCEDURE — 82962 GLUCOSE BLOOD TEST: CPT

## 2025-01-04 PROCEDURE — 6370000000 HC RX 637 (ALT 250 FOR IP): Performed by: STUDENT IN AN ORGANIZED HEALTH CARE EDUCATION/TRAINING PROGRAM

## 2025-01-04 PROCEDURE — 6360000002 HC RX W HCPCS: Performed by: STUDENT IN AN ORGANIZED HEALTH CARE EDUCATION/TRAINING PROGRAM

## 2025-01-04 PROCEDURE — 1100000000 HC RM PRIVATE

## 2025-01-04 PROCEDURE — 36415 COLL VENOUS BLD VENIPUNCTURE: CPT

## 2025-01-04 PROCEDURE — 6370000000 HC RX 637 (ALT 250 FOR IP): Performed by: HOSPITALIST

## 2025-01-04 PROCEDURE — 74018 RADEX ABDOMEN 1 VIEW: CPT

## 2025-01-04 PROCEDURE — 2500000003 HC RX 250 WO HCPCS: Performed by: STUDENT IN AN ORGANIZED HEALTH CARE EDUCATION/TRAINING PROGRAM

## 2025-01-04 RX ORDER — POLYETHYLENE GLYCOL 3350 17 G/17G
17 POWDER, FOR SOLUTION ORAL 2 TIMES DAILY
Status: DISCONTINUED | OUTPATIENT
Start: 2025-01-04 | End: 2025-01-05 | Stop reason: HOSPADM

## 2025-01-04 RX ORDER — LACTULOSE 10 G/15ML
20 SOLUTION ORAL 3 TIMES DAILY
Status: COMPLETED | OUTPATIENT
Start: 2025-01-04 | End: 2025-01-05

## 2025-01-04 RX ADMIN — DORZOLAMIDE HYDROCHLORIDE AND TIMOLOL MALEATE 1 DROP: 20; 5 SOLUTION/ DROPS OPHTHALMIC at 20:06

## 2025-01-04 RX ADMIN — PREDNISOLONE ACETATE 1 DROP: 10 SUSPENSION/ DROPS OPHTHALMIC at 08:59

## 2025-01-04 RX ADMIN — ENOXAPARIN SODIUM 40 MG: 100 INJECTION SUBCUTANEOUS at 08:52

## 2025-01-04 RX ADMIN — INSULIN LISPRO 2 UNITS: 100 INJECTION, SOLUTION INTRAVENOUS; SUBCUTANEOUS at 17:12

## 2025-01-04 RX ADMIN — ONDANSETRON 4 MG: 2 INJECTION INTRAMUSCULAR; INTRAVENOUS at 15:23

## 2025-01-04 RX ADMIN — PREDNISOLONE ACETATE 1 DROP: 10 SUSPENSION/ DROPS OPHTHALMIC at 20:07

## 2025-01-04 RX ADMIN — ONDANSETRON 4 MG: 2 INJECTION INTRAMUSCULAR; INTRAVENOUS at 08:52

## 2025-01-04 RX ADMIN — TRAMADOL HYDROCHLORIDE 75 MG: 50 TABLET, COATED ORAL at 12:09

## 2025-01-04 RX ADMIN — DOCUSATE SODIUM 100 MG: 100 CAPSULE, LIQUID FILLED ORAL at 08:52

## 2025-01-04 RX ADMIN — SODIUM CHLORIDE, PRESERVATIVE FREE 10 ML: 5 INJECTION INTRAVENOUS at 20:10

## 2025-01-04 RX ADMIN — LACTULOSE 20 G: 10 SOLUTION ORAL at 20:05

## 2025-01-04 RX ADMIN — DORZOLAMIDE HYDROCHLORIDE AND TIMOLOL MALEATE 1 DROP: 20; 5 SOLUTION/ DROPS OPHTHALMIC at 08:53

## 2025-01-04 RX ADMIN — TRAMADOL HYDROCHLORIDE 75 MG: 50 TABLET, COATED ORAL at 22:32

## 2025-01-04 RX ADMIN — POLYETHYLENE GLYCOL 3350 17 G: 17 POWDER, FOR SOLUTION ORAL at 12:03

## 2025-01-04 RX ADMIN — GABAPENTIN 100 MG: 100 CAPSULE ORAL at 08:52

## 2025-01-04 RX ADMIN — BRIMONIDINE TARTRATE 1 DROP: 2 SOLUTION OPHTHALMIC at 15:21

## 2025-01-04 RX ADMIN — PREDNISOLONE ACETATE 1 DROP: 10 SUSPENSION/ DROPS OPHTHALMIC at 17:13

## 2025-01-04 RX ADMIN — INSULIN LISPRO 2 UNITS: 100 INJECTION, SOLUTION INTRAVENOUS; SUBCUTANEOUS at 12:10

## 2025-01-04 RX ADMIN — ROSUVASTATIN CALCIUM 20 MG: 10 TABLET, FILM COATED ORAL at 08:52

## 2025-01-04 RX ADMIN — LATANOPROST 1 DROP: 50 SOLUTION OPHTHALMIC at 20:20

## 2025-01-04 RX ADMIN — LACTULOSE 20 G: 10 SOLUTION ORAL at 13:56

## 2025-01-04 RX ADMIN — PREDNISOLONE ACETATE 1 DROP: 10 SUSPENSION/ DROPS OPHTHALMIC at 12:58

## 2025-01-04 RX ADMIN — ACETAMINOPHEN 650 MG: 325 TABLET ORAL at 15:22

## 2025-01-04 RX ADMIN — MINERAL OIL, PETROLATUM: 425; 568 OINTMENT OPHTHALMIC at 20:07

## 2025-01-04 RX ADMIN — INSULIN LISPRO 4 UNITS: 100 INJECTION, SOLUTION INTRAVENOUS; SUBCUTANEOUS at 20:20

## 2025-01-04 RX ADMIN — BRIMONIDINE TARTRATE 1 DROP: 2 SOLUTION OPHTHALMIC at 20:07

## 2025-01-04 RX ADMIN — TRAMADOL HYDROCHLORIDE 75 MG: 50 TABLET, COATED ORAL at 06:35

## 2025-01-04 RX ADMIN — CARVEDILOL 12.5 MG: 12.5 TABLET, FILM COATED ORAL at 08:52

## 2025-01-04 RX ADMIN — BRIMONIDINE TARTRATE 1 DROP: 2 SOLUTION OPHTHALMIC at 08:54

## 2025-01-04 RX ADMIN — SODIUM CHLORIDE, PRESERVATIVE FREE 10 ML: 5 INJECTION INTRAVENOUS at 09:00

## 2025-01-04 RX ADMIN — HYDROCHLOROTHIAZIDE 12.5 MG: 25 TABLET ORAL at 08:52

## 2025-01-04 RX ADMIN — DOCUSATE SODIUM 100 MG: 100 CAPSULE, LIQUID FILLED ORAL at 20:05

## 2025-01-04 RX ADMIN — MINERAL OIL, PETROLATUM: 425; 568 OINTMENT OPHTHALMIC at 13:56

## 2025-01-04 RX ADMIN — CARVEDILOL 12.5 MG: 12.5 TABLET, FILM COATED ORAL at 20:05

## 2025-01-04 RX ADMIN — AMLODIPINE BESYLATE 10 MG: 5 TABLET ORAL at 08:52

## 2025-01-04 ASSESSMENT — PAIN SCALES - GENERAL
PAINLEVEL_OUTOF10: 0
PAINLEVEL_OUTOF10: 0
PAINLEVEL_OUTOF10: 8
PAINLEVEL_OUTOF10: 0
PAINLEVEL_OUTOF10: 8
PAINLEVEL_OUTOF10: 7

## 2025-01-04 ASSESSMENT — PAIN DESCRIPTION - LOCATION
LOCATION: HAND;LEG
LOCATION: ABDOMEN
LOCATION: ABDOMEN
LOCATION: HAND

## 2025-01-04 ASSESSMENT — PAIN DESCRIPTION - PAIN TYPE
TYPE: ACUTE PAIN
TYPE: ACUTE PAIN

## 2025-01-04 ASSESSMENT — PAIN DESCRIPTION - ONSET
ONSET: ON-GOING
ONSET: ON-GOING

## 2025-01-04 ASSESSMENT — PAIN DESCRIPTION - DESCRIPTORS
DESCRIPTORS: ACHING
DESCRIPTORS: ACHING

## 2025-01-04 ASSESSMENT — PAIN DESCRIPTION - ORIENTATION
ORIENTATION: ANTERIOR
ORIENTATION: ANTERIOR
ORIENTATION: RIGHT;LEFT;LOWER

## 2025-01-04 ASSESSMENT — PAIN - FUNCTIONAL ASSESSMENT
PAIN_FUNCTIONAL_ASSESSMENT: ACTIVITIES ARE NOT PREVENTED
PAIN_FUNCTIONAL_ASSESSMENT: ACTIVITIES ARE NOT PREVENTED

## 2025-01-04 ASSESSMENT — PAIN DESCRIPTION - FREQUENCY
FREQUENCY: CONTINUOUS
FREQUENCY: CONTINUOUS

## 2025-01-04 NOTE — PLAN OF CARE
Problem: Pain  Goal: Verbalizes/displays adequate comfort level or baseline comfort level  Outcome: Progressing  Flowsheets (Taken 1/4/2025 0942)  Verbalizes/displays adequate comfort level or baseline comfort level:   Assess pain using appropriate pain scale   Administer analgesics based on type and severity of pain and evaluate response   Encourage patient to monitor pain and request assistance     Problem: Skin/Tissue Integrity  Goal: Absence of new skin breakdown  Description:  Monitor for areas of redness and/or skin breakdown  Outcome: Progressing     Problem: Safety - Adult  Goal: Free from fall injury  Outcome: Progressing  Flowsheets (Taken 1/4/2025 0942)  Free From Fall Injury: Instruct family/caregiver on patient safety

## 2025-01-05 VITALS
SYSTOLIC BLOOD PRESSURE: 137 MMHG | HEIGHT: 68 IN | OXYGEN SATURATION: 98 % | RESPIRATION RATE: 18 BRPM | DIASTOLIC BLOOD PRESSURE: 63 MMHG | HEART RATE: 69 BPM | BODY MASS INDEX: 31.22 KG/M2 | WEIGHT: 206 LBS | TEMPERATURE: 97.6 F

## 2025-01-05 LAB
ANION GAP SERPL CALC-SCNC: 6 MMOL/L (ref 3–18)
BUN SERPL-MCNC: 17 MG/DL (ref 7–18)
BUN/CREAT SERPL: 13 (ref 12–20)
CALCIUM SERPL-MCNC: 8.9 MG/DL (ref 8.5–10.1)
CHLORIDE SERPL-SCNC: 96 MMOL/L (ref 100–111)
CO2 SERPL-SCNC: 32 MMOL/L (ref 21–32)
CREAT SERPL-MCNC: 1.29 MG/DL (ref 0.6–1.3)
GLUCOSE BLD STRIP.AUTO-MCNC: 219 MG/DL (ref 70–110)
GLUCOSE BLD STRIP.AUTO-MCNC: 257 MG/DL (ref 70–110)
GLUCOSE SERPL-MCNC: 201 MG/DL (ref 74–99)
POTASSIUM SERPL-SCNC: 4.5 MMOL/L (ref 3.5–5.5)
SODIUM SERPL-SCNC: 134 MMOL/L (ref 136–145)

## 2025-01-05 PROCEDURE — 36415 COLL VENOUS BLD VENIPUNCTURE: CPT

## 2025-01-05 PROCEDURE — 82962 GLUCOSE BLOOD TEST: CPT

## 2025-01-05 PROCEDURE — 6370000000 HC RX 637 (ALT 250 FOR IP): Performed by: STUDENT IN AN ORGANIZED HEALTH CARE EDUCATION/TRAINING PROGRAM

## 2025-01-05 PROCEDURE — 2500000003 HC RX 250 WO HCPCS: Performed by: STUDENT IN AN ORGANIZED HEALTH CARE EDUCATION/TRAINING PROGRAM

## 2025-01-05 PROCEDURE — 6370000000 HC RX 637 (ALT 250 FOR IP): Performed by: HOSPITALIST

## 2025-01-05 PROCEDURE — 6360000002 HC RX W HCPCS: Performed by: STUDENT IN AN ORGANIZED HEALTH CARE EDUCATION/TRAINING PROGRAM

## 2025-01-05 PROCEDURE — 80048 BASIC METABOLIC PNL TOTAL CA: CPT

## 2025-01-05 RX ORDER — TRAMADOL HYDROCHLORIDE 50 MG/1
75 TABLET ORAL EVERY 6 HOURS PRN
Qty: 6 TABLET | Refills: 0 | Status: SHIPPED | OUTPATIENT
Start: 2025-01-05 | End: 2025-01-06

## 2025-01-05 RX ORDER — GABAPENTIN 100 MG/1
100 CAPSULE ORAL DAILY
Qty: 1 CAPSULE | Refills: 0 | Status: SHIPPED | OUTPATIENT
Start: 2025-01-05 | End: 2025-01-06

## 2025-01-05 RX ORDER — INSULIN LISPRO 100 [IU]/ML
0-8 INJECTION, SOLUTION INTRAVENOUS; SUBCUTANEOUS
Qty: 10 ML | Refills: 0
Start: 2025-01-05

## 2025-01-05 RX ORDER — POLYETHYLENE GLYCOL 3350 17 G/17G
17 POWDER, FOR SOLUTION ORAL 2 TIMES DAILY
Qty: 527 G | Refills: 0
Start: 2025-01-05 | End: 2025-02-04

## 2025-01-05 RX ORDER — HYDROCHLOROTHIAZIDE 12.5 MG/1
12.5 TABLET ORAL DAILY
Qty: 30 TABLET | Refills: 0
Start: 2025-01-05

## 2025-01-05 RX ADMIN — BRIMONIDINE TARTRATE 1 DROP: 2 SOLUTION OPHTHALMIC at 13:34

## 2025-01-05 RX ADMIN — LACTULOSE 20 G: 10 SOLUTION ORAL at 08:24

## 2025-01-05 RX ADMIN — INSULIN LISPRO 4 UNITS: 100 INJECTION, SOLUTION INTRAVENOUS; SUBCUTANEOUS at 11:21

## 2025-01-05 RX ADMIN — DORZOLAMIDE HYDROCHLORIDE AND TIMOLOL MALEATE 1 DROP: 20; 5 SOLUTION/ DROPS OPHTHALMIC at 08:20

## 2025-01-05 RX ADMIN — ENOXAPARIN SODIUM 40 MG: 100 INJECTION SUBCUTANEOUS at 08:26

## 2025-01-05 RX ADMIN — AMLODIPINE BESYLATE 10 MG: 5 TABLET ORAL at 08:25

## 2025-01-05 RX ADMIN — PREDNISOLONE ACETATE 1 DROP: 10 SUSPENSION/ DROPS OPHTHALMIC at 13:34

## 2025-01-05 RX ADMIN — MINERAL OIL, PETROLATUM: 425; 568 OINTMENT OPHTHALMIC at 08:32

## 2025-01-05 RX ADMIN — DOCUSATE SODIUM 100 MG: 100 CAPSULE, LIQUID FILLED ORAL at 08:24

## 2025-01-05 RX ADMIN — CARVEDILOL 12.5 MG: 12.5 TABLET, FILM COATED ORAL at 08:24

## 2025-01-05 RX ADMIN — SODIUM CHLORIDE, PRESERVATIVE FREE 10 ML: 5 INJECTION INTRAVENOUS at 08:28

## 2025-01-05 RX ADMIN — GABAPENTIN 100 MG: 100 CAPSULE ORAL at 08:25

## 2025-01-05 RX ADMIN — PREDNISOLONE ACETATE 1 DROP: 10 SUSPENSION/ DROPS OPHTHALMIC at 08:30

## 2025-01-05 RX ADMIN — ACETAMINOPHEN 1000 MG: 500 TABLET ORAL at 08:40

## 2025-01-05 RX ADMIN — TRAMADOL HYDROCHLORIDE 75 MG: 50 TABLET, COATED ORAL at 13:41

## 2025-01-05 RX ADMIN — POLYETHYLENE GLYCOL 3350 17 G: 17 POWDER, FOR SOLUTION ORAL at 08:21

## 2025-01-05 RX ADMIN — BRIMONIDINE TARTRATE 1 DROP: 2 SOLUTION OPHTHALMIC at 08:26

## 2025-01-05 RX ADMIN — HYDROCHLOROTHIAZIDE 12.5 MG: 25 TABLET ORAL at 08:24

## 2025-01-05 RX ADMIN — INSULIN LISPRO 2 UNITS: 100 INJECTION, SOLUTION INTRAVENOUS; SUBCUTANEOUS at 06:45

## 2025-01-05 RX ADMIN — ACETAMINOPHEN 1000 MG: 500 TABLET ORAL at 01:49

## 2025-01-05 RX ADMIN — TRAMADOL HYDROCHLORIDE 75 MG: 50 TABLET, COATED ORAL at 05:50

## 2025-01-05 RX ADMIN — ROSUVASTATIN CALCIUM 20 MG: 10 TABLET, FILM COATED ORAL at 08:41

## 2025-01-05 ASSESSMENT — PAIN DESCRIPTION - LOCATION
LOCATION: LEG;HAND
LOCATION: HAND;LEG
LOCATION: ABDOMEN
LOCATION: ABDOMEN

## 2025-01-05 ASSESSMENT — PAIN DESCRIPTION - ORIENTATION
ORIENTATION: RIGHT;LEFT
ORIENTATION: RIGHT;LEFT
ORIENTATION: ANTERIOR
ORIENTATION: ANTERIOR

## 2025-01-05 ASSESSMENT — PAIN DESCRIPTION - ONSET
ONSET: ON-GOING
ONSET: ON-GOING

## 2025-01-05 ASSESSMENT — PAIN SCALES - GENERAL
PAINLEVEL_OUTOF10: 0
PAINLEVEL_OUTOF10: 7
PAINLEVEL_OUTOF10: 8
PAINLEVEL_OUTOF10: 6
PAINLEVEL_OUTOF10: 6

## 2025-01-05 ASSESSMENT — PAIN DESCRIPTION - DESCRIPTORS
DESCRIPTORS: ACHING

## 2025-01-05 ASSESSMENT — PAIN DESCRIPTION - PAIN TYPE
TYPE: ACUTE PAIN
TYPE: ACUTE PAIN

## 2025-01-05 ASSESSMENT — PAIN DESCRIPTION - FREQUENCY
FREQUENCY: CONTINUOUS
FREQUENCY: CONTINUOUS

## 2025-01-05 NOTE — DISCHARGE SUMMARY
Discharge Summary    Patient: Gwen Jorgensen               Sex: female          DOA: 12/27/2024         YOB: 1950      Age:  74 y.o.        LOS:  LOS: 9 days                Admit Date: 12/27/2024    Discharge Date: 1/5/2025    Admission Diagnoses: Dehydration [E86.0]  Generalized abdominal pain [R10.84]  Nausea & vomiting [R11.2]  Urinary tract infection without hematuria, site unspecified [N39.0]  Nausea and vomiting, unspecified vomiting type [R11.2]    Discharge Diagnoses:    Hospital Problems             Last Modified POA    * (Principal) Nausea & vomiting 12/27/2024 Yes    Diabetes ([facility]) 12/28/2024 Yes    Hypertension 12/28/2024 Yes    Positive blood culture 12/30/2024 Yes    S/P bilateral BKA (below knee amputation) ([facility]ly [facility]) 12/30/2024 Yes    UTI (urinary tract infection) 1/1/2025 Yes        Discharge Condition: Stable    Hospital Course ;   Gwen Jorgensen is a 74 y.o.  female with history of bilateral below-knee amputation in the setting of uncontrolled diabetes, hyperlipidemia, and hypertension who presents to the ED with persistent/intractable nausea and vomiting with associated generalized abdominal pain since last night.  On account of persistent symptoms resulting in weakness she was brought in by the EMS for further evaluation.  Upon evaluation in the ED, she denied associated fevers, chills, diarrhea, chest pain, shortness of breath, cough, or contact with sick persons. She also denies increased urinary frequency, urgency, dysuria and nocturia.   Pt was admitted for nv -  Nv resolved on dc and tolerated diet well   Not sure etiology , may be related chronic constipation   Ct abdomen on 12/28 no acute process   Symptom treatment       Positive bcx repeat the culture no growth,high-grade staph epi bacteremia   No wbc and no fever   ID consult on board change  received Ancef and completed abx   Echo EF normal.  No vegetation noted.     UTI urine culture positive for E. Coli on Ancef -completed

## 2025-01-05 NOTE — PROGRESS NOTES
Hospitalist Progress Note    Patient: Gwen Jorgensen MRN: [id number]  CSN: [id number]    YOB: 1950  Age: 74 y.o.  Sex: female    DOA: 12/27/2024 LOS:  LOS: 1 day          Chief Complain :  N/V  74 y.o.  female with history of bilateral below-knee amputation in the setting of uncontrolled diabetes, hyperlipidemia, and hypertension who presents to the ED with persistent/intractable nausea and vomiting with associated generalized abdominal pain . Labs were essentially unremarkable with UA positive for UTI.   Subjective:   Patient laying in bed, complains of pain at amputation site bilaterally. Amputation 13 years ago.    Assessment/Plan     Active Hospital Problems    Diagnosis     Diabetes (HCC) [E11.9]     Hypertension [I10]     Nausea & vomiting [R11.2]           UTI  Follow urine cultures  Continue ceftriaxone    N/V  Resolved  Zofran as needed    DM  On SSI  Monitor blood sugars  Diabetic diet    HTN  Continue with amlodipine, coreg.    Bilateral BKA      DVT Prophylaxis:  [x]Lovenox SQ  [] Heparin SQ  [] SCDs  [] Coumadin, Eliquis, Xarelto,   [] On Heparin gtt or therapeutic Lovenox. [] Patient refused.       Case discussed with:  [x]Patient  []Family [] Consultants  [x]Nursing  []Case Management   [] Others      Discharge to;  [] Home  [x] Home Health  [] SNF/Rehab  [] LTAC. In 2 days    Review of systems : As above and,  General: No fevers or chills.  Cardiovascular: No chest pain or pressure. No palpitations.   Pulmonary: No shortness of breath.   Gastrointestinal: No nausea, vomiting.     Physical Exam: As above and,  General: Awake, cooperative, no acute distress    HEENT: NC, Atraumatic.  PERRLA, anicteric 
                                                                                                                                                                                                                                                                                                  Hospitalist Progress Note    Patient: Gwen Jorgensen MRN: [id number]  CSN: [id number]    YOB: 1950  Age: 74 y.o.  Sex: female    DOA: 12/27/2024 LOS:  LOS: 2 days          Chief Complain :  N/V  74 y.o.  female with history of bilateral below-knee amputation in the setting of uncontrolled diabetes, hyperlipidemia, and hypertension who presents to the ED with persistent/intractable nausea and vomiting with associated generalized abdominal pain . Labs were essentially unremarkable with UA positive for UTI.   Subjective:   Patient laying in bed, complains of pain at amputation site bilaterally. Amputation 13 years ago.    Assessment/Plan     Active Hospital Problems    Diagnosis     Diabetes (HCC) [E11.9]     Hypertension [I10]     Nausea & vomiting [R11.2]           UTI  Follow urine cultures  Continue ceftriaxone    N/V  Resolved  Zofran as needed    DM  On SSI  Monitor blood sugars  Diabetic diet    HTN  Continue with amlodipine, coreg.    Bilateral BKA  13 years ago  Complains of pain at her stump site bilaterally.  Bilateral knee and stump examined, TTP but no redness or swelling.    Will get X-ray knee bilaterally      DVT Prophylaxis:  [x]Lovenox SQ  [] Heparin SQ  [] SCDs  [] Coumadin, Eliquis, Xarelto,   [] On Heparin gtt or therapeutic Lovenox. [] Patient refused.       Case discussed with:  [x]Patient  []Family [] Consultants  [x]Nursing  []Case Management   [] Others      Discharge to;  [] Home  [x] Home Health  [] SNF/Rehab  [] LTAC. In 2 days    Review of systems : As above and,  General: No fevers or chills.  Cardiovascular: No chest pain or pressure. No palpitations.   Pulmonary: No shortness of breath. 
       [facility]  (A Division of [facility])                                                           Date of Admission: 12/27/2024       Reason for Consult FU: Antibiotic management for bacteremia  --MSSE       C/C: N/V/Dizziness on presenting to ED    Current Antimicrobials:    Prior Antimicrobials:    Cefazolin IV 12/30 to date   Ceftriaxone 12/27 to 12/30  Vancomycin 12/29 to 12/30   Allergy to antibiotics: NA       Assessment:     Staphylococcus epidermidis high-grade bacteremia 4/4 -MSSE by [employer], reported as multiple colony which correlates with likely procurement contamination     Transaminitis-could be from infection, improving-ALT//174 -total bili normal, biliary tree was unremarkable on CT she is postcholecystectomy    Diffusely healed skin rash-no active skin lesion  --Blood culture 12/27-4 out of 4 was coag negative staph, MSSA by [employer] [facility], final ID and sensitivity pending  --Blood culture 12/30-[facility]  --UA with WBC 11-20, culture in progress  --Respiratory  viral panel for adenovirus/Corona virus- COVID 19 and non COVID 19, human meta pneumovirus, rhino virus, Influenza A/B, parinflunenza 1-4, RSV negative. Bacterial PCR for bordetella, chlamydia pneumoniae and mycoplasma negative.    --Procalcitonin <0.05 12/30  --acute hepatitis panel for A/B/C-negative  --TTE- no mass or vegetation report    CXR-no acute finding   CT AP with IV-12/27:  IMPRESSION:  Fat-containing right lower quadrant abdominal hernia and incidentals  as above. No acute findings or other findings to correlate with abdominal pain or vomiting.    E.coli ( SS to cefazolin) and Aeroboccus bacteruria    Medical History:   Diagnosis Date    Diabetes (HCC)--uncontrolled, A1c 9.3%     High cholesterol     Hypertension    Morbid obesity-BMI 44  Bilateral BKA      Recommendation -- ID related:       Cont cefazolin 2 g every 8 hours- can DC today  TTE- no obvious IE-- multiple colony of 
    Hospitalist Progress Note    Patient: Gwen Jorgensen MRN: [id number]  CSN: [id number]    YOB: 1950  Age: 74 y.o.  Sex: female    DOA: 12/27/2024 LOS:  LOS: 4 days         Total duration of encounter: 4 days      Chief Complaint ;  Gwen Jorgensen is a 74 y.o.  female with history of bilateral below-knee amputation in the setting of uncontrolled diabetes, hyperlipidemia, and hypertension who presents to the ED with persistent/intractable nausea and vomiting with associated generalized abdominal pain since last night.     Subjective:   I need more pain meds. My leg pain .reported pain in stumps will have ct scan , x-ray done and good   Rn pain   Review of systems:  Constitutional: denies fevers, chills, myalgias  Respiratory: denies SOB, cough  Cardiovascular: denies chest pain, palpitations  Gastrointestinal: no nausea,  no vomiting, diarrhea    10 systems reviewed, all negative other than what is mentioned above.      Vital signs/Intake and Output:  Visit Vitals  BP (!) 171/73   Pulse 82   Temp 98.4 °F (36.9 °C) (Oral)   Resp 18   Ht 1.448 m (4' 9\")   Wt 93.4 kg (206 lb)   SpO2 93%   BMI 44.58 kg/m²     Current Shift:  12/31 0701 - 12/31 1900  In: -   Out: 1000 [Urine:1000]  Last three shifts:  12/29 1901 - 12/31 0700  In: 840 [P.O.:840]  Out: 3000 [Urine:3000]    Exam:    General: Well developed, alert, NAD, OX3  Head/Neck: NCAT, supple, No masses, No lymphadenopathy  CVS:Regular rate and rhythm, no M/R/G, S1/S2 heard, no thrill  Lungs:Clear to auscultation bilaterally, no wheezes, rhonchi, or rales  Abdomen: Soft, Nontender, No distention, Normal Bowel sounds, No hepatomegaly  Extremities: No C/C/E, pulses palpable 2+, bilateral BKA   Skin:normal texture and turgor, no rashes, no lesions  Neuro:grossly normal , follows commands  Psych:appropriate    Labs: Results:       Chemistry Recent Labs     12/29/24  1059 12/30/24  0348 12/31/24  0329   GLUCOSE 200* 213* 188*    137 137   K 4.0 3.8 3.7    
    Hospitalist Progress Note    Patient: Gwen Jorgensen MRN: [id number]  CSN: [id number]    YOB: 1950  Age: 74 y.o.  Sex: female    DOA: 12/27/2024 LOS:  LOS: 7 days         Total duration of encounter: 7 days      Chief Complaint ;  Gwen Jorgensen is a 74 y.o.  female with history of bilateral below-knee amputation in the setting of uncontrolled diabetes, hyperlipidemia, and hypertension who presents to the ED with persistent/intractable nausea and vomiting with associated generalized abdominal pain since last night.     Subjective:   I feel ok when I will leave here ?.    Pt lying in bed comfortable. Get authorization.  Planning DC tomorrow     Rn pain controlled   Review of systems:  Constitutional: denies fevers, chills, myalgias  Respiratory: denies SOB, cough  Cardiovascular: denies chest pain, palpitations  Gastrointestinal: no nausea,  no vomiting, diarrhea    10 systems reviewed, all negative other than what is mentioned above.      Vital signs/Intake and Output:  Visit Vitals  /76   Pulse 85   Temp 98.1 °F (36.7 °C) (Oral)   Resp 18   Ht 1.727 m (5' 7.99\")   Wt 93.4 kg (206 lb)   SpO2 91%   BMI 31.33 kg/m²     Current Shift:  No intake/output data recorded.  Last three shifts:  01/01 1901 - 01/03 0700  In: 450 [P.O.:450]  Out: 1100 [Urine:1100]    Exam:    General: Well developed, alert, NAD, OX3  Head/Neck: NCAT, supple, No masses, No lymphadenopathy  CVS:Regular rate and rhythm, no M/R/G, S1/S2 heard, no thrill  Lungs:Clear to auscultation bilaterally, no wheezes, rhonchi, or rales  Abdomen: Soft, Nontender, No distention, Normal Bowel sounds, No hepatomegaly  Extremities: No C/C/E, pulses palpable 2+, bilateral BKA   Skin:normal texture and turgor, no rashes, no lesions  Neuro:grossly normal , follows commands  Psych:appropriate    Labs: Results:       Chemistry Recent Labs     01/03/25  0148   GLUCOSE 187*   *   K 3.4*   CL 97*   CO2 31   BUN 18   CREATININE 1.47*   CALCIUM 9.0 
    Hospitalist Progress Note    Patient: Gwen Jorgensen MRN: [id number]  CSN: [id number]    YOB: 1950  Age: 74 y.o.  Sex: female    DOA: 12/27/2024 LOS:  LOS: 5 days         Total duration of encounter: 5 days      Chief Complaint ;  Gwen Jorgensen is a 74 y.o.  female with history of bilateral below-knee amputation in the setting of uncontrolled diabetes, hyperlipidemia, and hypertension who presents to the ED with persistent/intractable nausea and vomiting with associated generalized abdominal pain since last night.     Subjective:   I do not have BM not eating two much , pain is better   Add hctz for better bp control   Ct done and results pending     Rn pain controlled   Review of systems:  Constitutional: denies fevers, chills, myalgias  Respiratory: denies SOB, cough  Cardiovascular: denies chest pain, palpitations  Gastrointestinal: no nausea,  no vomiting, diarrhea    10 systems reviewed, all negative other than what is mentioned above.      Vital signs/Intake and Output:  Visit Vitals  BP (!) 159/60   Pulse 89   Temp 98.6 °F (37 °C) (Oral)   Resp 18   Ht 1.448 m (4' 9\")   Wt 93.4 kg (206 lb)   SpO2 94%   BMI 44.58 kg/m²     Current Shift:  No intake/output data recorded.  Last three shifts:  12/30 1901 - 01/01 0700  In: -   Out: 2975 [Urine:2975]    Exam:    General: Well developed, alert, NAD, OX3  Head/Neck: NCAT, supple, No masses, No lymphadenopathy  CVS:Regular rate and rhythm, no M/R/G, S1/S2 heard, no thrill  Lungs:Clear to auscultation bilaterally, no wheezes, rhonchi, or rales  Abdomen: Soft, Nontender, No distention, Normal Bowel sounds, No hepatomegaly  Extremities: No C/C/E, pulses palpable 2+, bilateral BKA   Skin:normal texture and turgor, no rashes, no lesions  Neuro:grossly normal , follows commands  Psych:appropriate    Labs: Results:       Chemistry Recent Labs     12/30/24  0348 12/31/24  0329   GLUCOSE 213* 188*    137   K 3.8 3.7    107   CO2 24 29   BUN 17 11 
    Hospitalist Progress Note    Patient: Gwen Jorgensen MRN: [id number]  CSN: [id number]    YOB: 1950  Age: 74 y.o.  Sex: female    DOA: 12/27/2024 LOS:  LOS: 3 days         Total duration of encounter: 3 days      Chief Complaint ;  Gwen Jorgensen is a 74 y.o.  female with history of bilateral below-knee amputation in the setting of uncontrolled diabetes, hyperlipidemia, and hypertension who presents to the ED with persistent/intractable nausea and vomiting with associated generalized abdominal pain since last night.     Subjective:   I need more pain meds. Pt lying in bed comfortable. Ct abdomen no acute process. Reported some nauseas     Review of systems:  Constitutional: denies fevers, chills, myalgias  Respiratory: denies SOB, cough  Cardiovascular: denies chest pain, palpitations  Gastrointestinal: +nausea,  no vomiting, diarrhea    10 systems reviewed, all negative other than what is mentioned above.      Vital signs/Intake and Output:  Visit Vitals  BP (!) 171/68   Pulse 91   Temp 98.8 °F (37.1 °C) (Oral)   Resp 16   Ht 1.448 m (4' 9\")   Wt 93.7 kg (206 lb 9.6 oz)   SpO2 96%   BMI 44.71 kg/m²     Current Shift:  12/30 0701 - 12/30 1900  In: -   Out: 1000 [Urine:1000]  Last three shifts:  12/28 1901 - 12/30 0700  In: 360 [P.O.:360]  Out: 400 [Urine:400]    Exam:    General: Well developed, alert, NAD, OX3  Head/Neck: NCAT, supple, No masses, No lymphadenopathy  CVS:Regular rate and rhythm, no M/R/G, S1/S2 heard, no thrill  Lungs:Clear to auscultation bilaterally, no wheezes, rhonchi, or rales  Abdomen: Soft, Nontender, No distention, Normal Bowel sounds, No hepatomegaly  Extremities: No C/C/E, pulses palpable 2+, bilateral BKA   Skin:normal texture and turgor, no rashes, no lesions  Neuro:grossly normal , follows commands  Psych:appropriate    Labs: Results:       Chemistry Recent Labs     12/27/24  1748 12/29/24  1059 12/30/24  0348   GLUCOSE 156* 200* 213*    139 137   K 3.9 4.0 3.8    
    Hospitalist Progress Note    Patient: Gwen Jorgensen MRN: [id number]  CSN: [id number]    YOB: 1950  Age: 74 y.o.  Sex: female    DOA: 12/27/2024 LOS:  LOS: 6 days         Total duration of encounter: 6 days      Chief Complaint ;  Gwen Jorgensen is a 74 y.o.  female with history of bilateral below-knee amputation in the setting of uncontrolled diabetes, hyperlipidemia, and hypertension who presents to the ED with persistent/intractable nausea and vomiting with associated generalized abdominal pain since last night.     Subjective:   I do have chronic pain issue.  I feel very good after my bowel movement.    Pt lying in bed comfortable.  Discussed with patient about weaning off IV pain medication for placement that she agreed with that..  Also recommended patient to follow-up with pain clinic after discharge.  CT scan results discussed with patient    Rn pain controlled   Review of systems:  Constitutional: denies fevers, chills, myalgias  Respiratory: denies SOB, cough  Cardiovascular: denies chest pain, palpitations  Gastrointestinal: no nausea,  no vomiting, diarrhea    10 systems reviewed, all negative other than what is mentioned above.      Vital signs/Intake and Output:  Visit Vitals  BP (!) 145/61   Pulse 88   Temp 98.4 °F (36.9 °C) (Oral)   Resp 18   Ht 1.448 m (4' 9\")   Wt 93.4 kg (206 lb)   SpO2 96%   BMI 44.58 kg/m²     Current Shift:  No intake/output data recorded.  Last three shifts:  12/31 1901 - 01/02 0700  In: -   Out: 1025 [Urine:1025]    Exam:    General: Well developed, alert, NAD, OX3  Head/Neck: NCAT, supple, No masses, No lymphadenopathy  CVS:Regular rate and rhythm, no M/R/G, S1/S2 heard, no thrill  Lungs:Clear to auscultation bilaterally, no wheezes, rhonchi, or rales  Abdomen: Soft, Nontender, No distention, Normal Bowel sounds, No hepatomegaly  Extremities: No C/C/E, pulses palpable 2+, bilateral BKA   Skin:normal texture and turgor, no rashes, no lesions  Neuro:grossly normal , 
    Hospitalist Progress Note    Patient: Gwen Jorgensen MRN: [id number]  CSN: [id number]    YOB: 1950  Age: 74 y.o.  Sex: female    DOA: 12/27/2024 LOS:  LOS: 8 days         Total duration of encounter: 8 days      Chief Complaint ;  Gwen Jorgensen is a 74 y.o.  female with history of bilateral below-knee amputation in the setting of uncontrolled diabetes, hyperlipidemia, and hypertension who presents to the ED with persistent/intractable nausea and vomiting with associated generalized abdominal pain since last night.     Subjective:   I feel nausea , not feel good kub -constipation -treat constipation and zofran   Rn stable -ask for pain meds     Planning DC tomorrow     Rn pain controlled   Review of systems:  Constitutional: denies fevers, chills, myalgias  Respiratory: denies SOB, cough  Cardiovascular: denies chest pain, palpitations  Gastrointestinal:  +nausea,  no vomiting, diarrhea    10 systems reviewed, all negative other than what is mentioned above.      Vital signs/Intake and Output:  Visit Vitals  /66   Pulse 74   Temp 98.4 °F (36.9 °C) (Oral)   Resp 17   Ht 1.727 m (5' 7.99\")   Wt 93.4 kg (206 lb)   SpO2 98%   BMI 31.33 kg/m²     Current Shift:  No intake/output data recorded.  Last three shifts:  01/02 1901 - 01/04 0700  In: 450 [P.O.:450]  Out: 700 [Urine:700]    Exam:    General: Well developed, alert, NAD, OX3  Head/Neck: NCAT, supple, No masses, No lymphadenopathy  CVS:Regular rate and rhythm, no M/R/G, S1/S2 heard, no thrill  Lungs:Clear to auscultation bilaterally, no wheezes, rhonchi, or rales  Abdomen: Soft, Nontender, No distention, Normal Bowel sounds, No hepatomegaly  Extremities: No C/C/E, pulses palpable 2+, bilateral BKA   Skin:normal texture and turgor, no rashes, no lesions  Neuro:grossly normal , follows commands  Psych:appropriate    Labs: Results:       Chemistry Recent Labs     01/03/25  0148 01/04/25  0450   GLUCOSE 187* 163*   * 134*   K 3.4* 4.6   CL 97* 98* 
  PHYSICAL THERAPY TREATMENT    Patient: Gwen Jorgensen (74 y.o. female)  Date: 1/2/2025  Diagnosis: Dehydration [E86.0]  Generalized abdominal pain [R10.84]  Nausea & vomiting [R11.2]  Urinary tract infection without hematuria, site unspecified [N39.0]  Nausea and vomiting, unspecified vomiting type [R11.2] Nausea & vomiting      Precautions: Fall Risk,  ,  ,  ,  ,  ,  ,        ASSESSMENT:  Pt reports feeling worse today, but agrees to session. Seeing used to work on transfers to/from Bedside chair. Transfer to chair is fair, with Fair UE use and Min assistance. Exercises performed as listed below. Pt exhibits good sitting/postural control and agrees to sit for lunch consumption. 35 min tolerated, before request to return to supine (C/o lightheadedness). BP of 146/61. Pt uses right side lying to rolling method and required very little assist for return. Positioned for comfort. RN informed of session outcome and complaints.          Progression toward goals: Fair+  []      Improving appropriately and progressing toward goals  [x]      Improving slowly and progressing toward goals  []      Not making progress toward goals and plan of care will be adjusted     PLAN:  Patient continues to benefit from skilled intervention to address the above impairments.  Continue treatment per established plan of care.    Further Equipment Recommendations for Discharge: bedside commode and hospital bed    [facility]:   AM-PAC Inpatient Mobility without Stair Climbing Raw Score : 11    Current research shows that an AM-PAC score of 17 (13 without stairs) or less is not associated with a discharge to the patient's home setting. Based on an AM-PAC score and their current functional mobility deficits, it is recommended that the patient have 5-7 sessions per week of Physical Therapy at d/c to increase the patient's independence.  Currently, this patient demonstrates the potential endurance, and/or tolerance for 3 hours of therapy each day at 
 met patient at bedside for an initial visit.    Patient said she came to the hospital for treatment for pain and sickness. She said she was feeling better but doctors do not know what was causing her symptoms. Patient said she would be going to rehab. Patient thanked  for the visit.     provided presence and support for patient.    Chaplains will provide follow-up care for patient and family as needed.    Spiritual Health History and Assessment/Progress Note  [facility]    Spiritual/Emotional Needs,  ,  ,      Name: Gwen Jorgensen MRN: [id number]    Age: 74 y.o.     Sex: female   Language: English   Shinto: Caodaism   Nausea & vomiting     Date: 1/3/2025                           Spiritual Assessment began in [address] SURGICAL/ONCOLOGY            Encounter Overview/Reason: Spiritual/Emotional Needs  Service Provided For: Patient    Feli, Belief, Meaning:   Patient identifies as spiritual, is connected with a feli tradition or spiritual practice, and has beliefs or practices that help with coping during difficult times  Family/Friends No family/friends present      Importance and Influence:  Patient has spiritual/personal beliefs that influence decisions regarding their health  Family/Friends No family/friends present    Community:  Patient feels well-supported. Support system includes: Children  Family/Friends No family/friends present    Assessment and Plan of Care:     Patient Interventions include: Facilitated expression of thoughts and feelings, Explored spiritual coping/struggle/distress, and Affirmed coping skills/support systems  Family/Friends Interventions include: No family/friends present    Patient Plan of Care: No spiritual needs identified for follow-up  Family/Friends Plan of Care: No family/friends present    Electronically signed by Chaplain Lizbet on 1/3/2025 at 11:01 AM   
0830  Patient complains of severe pain below the knees bilaterally. Patient assessed, patient groaning in pain. BP at 175/71.    0939  Tramadol 50 mg and  Toradol 15 mg given.    0840  BP rechecked manually, BP at 192/89. PRN hydralazine 20 mg given.    0923  Reassessed patient, patient still groaning in pain. Patient states \"pain meds is not helping my pain.\" Dr. Ulloa notified       
0900 - Assessment completed as per flowsheet. Patient alert & oriented x4. No s/s of any respiratory distress. Reports slight nausea, but no vomiting. Purewick in place. PRN Tramadol and Toradol administered for c/o headache and bilateral leg pain 10/10. Patient resting in bed with call light in reach.     1841 - PRN Tramadol administered for 7/10 pain to bilateral legs. Patient resting in bed with call light in reach.   
0916 Pt complains of 10/10 sharp, throbbing pain to bilateral LLE from BKA 13 years ago. Pt states that the Gabapentin given at 0100 helped relieve the pain enough to sleep but that it never goes away and it has gotten bad enough she finally came in to the hospital. Pt boosted up in bed and roll placed under knees to elevate LLE. No nausea or vomiting present, pt sat up in bed and set up for breakfast. Will continue to monitor.     1225 Pt complaining of 10/10 pain to BLE, Toradol administered .     1350 Pt resting in bed with eyes closed and moaning. Pain continues to be unrelieved, repositioning provided with some relief.     1354 Pt states that she lives in a two-story home with her Niece, Mariana. She expresses concern about being potentially discharged tomorrow since her niece will be out of town until January 1st and she is not sure she will be able to care for herself appropriately until then.     1510 Pt complains of pain bilaterally BKA, provided 1,000mg of Tylenol. Pt readjusted to left hip and lower extremities elevated with pillow and blanket wedge.     1846 , Yaneli Polo, reporting Gram Postive Cocci in Clusters growing in Anaerobic bottle collected on 12/27/24. MD notified via [employer]  
1/1/2025  PT treatment not completed due to:  [] Off Unit for testing/procedure  [] Pain  [] Eating  [] Patient too lethargic  [x] Nausea/vomiting - Pt reporting nausea and declining therapy (per OT note on 2nd attempt; also note CT reports in progress at this time.  [] Dialysis treatment in progress   [x] Other: Will hold Treatment at this time as pt awaiting STAT CT's of bilat knees due to residual limb pain.    Will f/u accordingly.  Thank you.   MYRA Cruz, PT      
1908-Bedside report received from AISHA Cantu. Pt A & O X 4. Pain at 0. Pt without any issues. Call light within reach.  2030-Pt resting in bed at this time. IV site to L AC  patent and intact. Pt A & O x 4. LS clear, on RA.  + CSM. Pain at 0. + BS to all 4 quadrants. Pt Denies nausea for now. Call light within reach. Pt denies any needs at this time.  0020-Pt's BP high but was recently given coreg, is in pain at 8. Will re-check when pain subsides.  Other than high BP episodes, Pt had an uneventful shift. RN aware that pt has PRN hydralazine. Pain remained well-controlled with the ordered medication. No issues/concerns at this time. Call bell within reach    
1919 Bedside and Verbal shift change report given to Usman RN (oncoming nurse) by Kelli RN (offgoing nurse). Report included the following information Nurse Handoff Report, Adult Overview, Intake/Output, MAR, Recent Results, and Med Rec Status.     
1955-Bedside report received from AISHA Abbasi. Pt A & O X 4. Pain at 7, will medicate. Pt without any issues. Call light within reach.  2015-Pt resting in bed at this time. IV site to L FA and L AC  patent and intact. Pt A & O x 4. LS clear, on RA.. + CSM. Pain at 8, Dilaudid given. + BS to all 4 quadrants. Pt denies nausea. Call light within raech. Pt denies any needs at this time.  Pt's pain does not seem to be well-controlled, getting all ordered pain meds.  Day RN aware to follow up.. No other issues/concerns at this time. Call bell within reach      
Bedside and Verbal shift change report given to AISHA Cantu (oncoming nurse) by AISHA Mary (offgoing nurse). Report included the following information Nurse Handoff Report, Adult Overview, Surgery Report, Intake/Output, MAR, Recent Results, and Med Rec Status.   
Bedside and Verbal shift change report given to AISHA Pereira  (oncoming nurse) by AISHA Mary (offgoing nurse). Report included the following information Nurse Handoff Report, Index, Adult Overview, Intake/Output, MAR, and Recent Results.    
Bedside and Verbal shift change report given to Castillo HOGAN (oncoming nurse). Report included the following information Nurse Handoff Report, Index, Adult Overview, Intake/Output, MAR, and Recent Results.     
Bedside shift change report given to AISHA Panda (oncoming nurse) by Ayleen Magallanes RN   (offgoing nurse). Report included the following information Nurse Handoff Report, Index, Adult Overview, Intake/Output, MAR, and Recent Results. Opportunity for questioing was provided.   
Bedside shift change report given to AISHA Sharma (oncoming nurse) by Ayleen Magallanes RN   (offgoing nurse). Report included the following information Nurse Handoff Report, Index, Adult Overview, Intake/Output, MAR, and Recent Results. Opportunity for questioning was provided.   
Care  assisting Care Mgr SPENCER Shen with Discharge Planning needs for patient.  Updates sent to area Skilled Nursing Facilities (SNF's - patient's preference) and area Home Health Agencies (patient's preference) for review and consideration for placement and/or acceptance for home care needs.    Will follow.  
Care  assisting Care SPENCER Dudley with Discharge Planning needs for patient.  Referrals sent to area Skilled Nursing Facilities (SNF's - patient's preference) for review and consideration for placement.    Will follow.    12/30/2024 2:25 p.m.  Referrals sent to area Home Health Agencies (patient's preference) and as requested by Care SPENCER Dudley for review and consideration for acceptance for Home Care needs.    Will continue to follow.  
Care  assisting SPENCER Shen with Discharge Planning needs for patient.  Updates sent to area Skilled Nursing Facilities (SNF's), area Acute Rehabilitation Facilities, and area Home Health Agencies (patient's preference) for review and consideration for placement and/or acceptance.    Will follow.  
Moderate Risk Nutrition Assessment Initial    Type and Reason for Visit: Initial, LOS    Nutrition Recommendations/Plan:   Cont diet as beatriz   May need to add 4 carb dm diet restrictions as well  If with poor PO <=25% PO intakes add Glucerna Shakes provides 220kcal 10g pro per carton  Consider adding Nephro-mariela or Charlotte-mariela daily   Cont bowel regimen as needed for BMs  Cont to monitor wt trends, renal fx, lytes, UOP, bowel fx, skin integrity, and adjust recs as needed     Malnutrition Assessment:  Malnutrition Status: At risk for malnutrition    Nutrition Assessment:  73yo F h/o B/L BKA 13yrs ago, uncontrolled DM, HLD, HTN, presents with persistent/intractable n/v w/associated generalized abd pain, pt reported has chronic pain issue and feels good after BM; n/v unsure etiology s/p CT abd 12/28 no acute process, UDS not done, +BCx repeat, UTI per MD.  on low Na diet, appears eating mostly fair to good with some variable PO poor to good. wt trends up since admit ?fluid vs accuracy, stated admit wt up since June 65kg but stable since Aug 90kg. at high risk for skin breakdown.  good UOP.    Estimated Daily Nutrient Needs:  Energy (kcal):  1600kcal/day Weight Used for Energy Requirements: Adjusted (Bilateral BKA)     Protein (g):  70-75g pro/day Weight Used for Protein Requirements: Adjusted        Fluid (ml/day):  1600ml fluid/day Method Used for Fluid Requirements: 1 ml/kcal    Nutrition Related Findings:   Na 131, K 3.4, Cr 1.47, GFR 37, Mg 2.1, , , Ca 9.0, A1c 9.3; colace, lispro, klor-con, crestor, Wound Type: None (at high risk for skin breakdown)    Current Nutrition Therapies:    ADULT DIET; Regular; Low Sodium (2 gm)    Anthropometric Measures:  Height: 172.7 cm (5' 7.99\") (ht h/o 5'8\" prior to B/L BKA (use this to calculate adj wt for bilateral BKA amputations))  Current Body Wt: 93 kg (205 lb 0.4 oz)   BMI: 31.2    Nutrition Diagnosis:   Altered nutrition-related lab values related to increase 
Occupational Therapy Evaluation/Treatment Attempt    Chart reviewed. Attempted Occupational Therapy Evaluation/Treatment, however, patient unable to be seen due to:  [x]  Nausea/vomiting- Pt reporting nausea at this time and declining therapy.   []  Eating  [x]  Pain  []  Patient too lethargic  []  Off Unit for testing/procedure  []  Dialysis treatment in progress   []  Telemetry Results  []  Other:     Will follow up later as patient's schedule allows.   Thank you for this referral.  Georgina Harris OTR/L  
Occupational Therapy Goals:  Initiated 1/2/2025 to be met within 7-10 days.  Short Term Goals  Time Frame for Short Term Goals: 7 days  Short Term Goal 1: Patient will complete grooming EOB with mod I  Short Term Goal 2: Patient will complete bed to chair/bsc slide board transfer with min A  Short Term Goal 3: Patient will complete LBD with SBA  Short Term Goal 4: Patient will complete bathing with min A  Short Term Goal 5: Patient will complete toileting with min A      OCCUPATIONAL THERAPY TREATMENT    Patient: Gwen Jorgensen (74 y.o. female)  Date: 1/2/2025  Diagnosis: Dehydration [E86.0]  Generalized abdominal pain [R10.84]  Nausea & vomiting [R11.2]  Urinary tract infection without hematuria, site unspecified [N39.0]  Nausea and vomiting, unspecified vomiting type [R11.2] Nausea & vomiting      Precautions:   Fall Risk,     Chart, occupational therapy assessment, plan of care, and goals were reviewed.  ASSESSMENT: Upon arrival pt was semi-reclined in bed with pure wick in place, bed alarm not activated, and pt reporting 7/10 pain in bilateral knees and hands. Pt reports that pain in hands is more of a \"tingling sensation\"- pt reports history of neuropathy. Pt states that she is nauseous- RN provided anti-nausea medication prior to session. Pt agreeable to sitting EOB to complete morning ADLs (required increased motivation to participate). Pt's BP prior to movement was 149/72 and HR 82 bpm. Pt completed supine to sit with CGA exiting bed to R hand side utilizing bed rails. Once seated at EOB, pt participates in morning ADLs.    ADLs:  Oral hygiene tasks- supervision seated at EOB  UBB- supervision seated at EOB  UBD- supervision seated at EOB  - Pt declines change of brief stating that this was done recently.     Mobility:   Supine to sit- CGA exiting bed to R hand side utilizing bed rails  Lateral scoots to HOB- CGA- pt not clearing bottom at this time- pt educated on plans for BUE HEP in order to improve BUE 
PHYSICAL THERAPY TREATMENT    Patient: Gwen Jorgensen (74 y.o. female)  Date: 12/29/2024  Diagnosis: Dehydration [E86.0]  Generalized abdominal pain [R10.84]  Nausea & vomiting [R11.2]  Urinary tract infection without hematuria, site unspecified [N39.0]  Nausea and vomiting, unspecified vomiting type [R11.2] Nausea & vomiting      Precautions: Fall Risk,  ,  ,  ,  ,  ,  ,      ASSESSMENT:  Pt with C/o excessive pain and not willing to attemot Slide board or scoot transfer to bedside chair. Agrees to EOB transition with UE ex to address strength and endurance. Pt demonstrates limited L UE strength (4-) but able to match reps of R UE. Lateral scoots performed with R>L. Returned to supine with R sided partial roll.  RN informed of high pain complaint and positioning post session.     Progression toward goals: Fair-  []      Improving appropriately and progressing toward goals  [x]      Improving slowly and progressing toward goals  []      Not making progress toward goals and plan of care will be adjusted     PLAN:  Patient continues to benefit from skilled intervention to address the above impairments.  Continue treatment per established plan of care.    Further Equipment Recommendations for Discharge: hospital bed, mechanical lift, and wheelchair      [facility]:   AM-PAC Inpatient Mobility without Stair Climbing Raw Score : 9    Current research shows that an AM-PAC score of 17 (13 without stairs) or less is not associated with a discharge to the patient's home setting. Based on an AM-PAC score and their current functional mobility deficits, it is recommended that the patient have 5-7 sessions per week of Physical Therapy at d/c to increase the patient's independence.  Currently, this patient demonstrates the potential endurance, and/or tolerance for 3 hours of therapy each day at d/c.    Current research shows that an AM-PAC score of 17 (13 without stairs) or less is not associated with a discharge to the patient's home 
Patient to DC to SNF today at  1400 via [facility]    Address: [address], [place] [address]    Phone: [phone]  
Received pt from ED via stretcher. She is alert and oriented, c/o carl leg pain 10/10. Will f/u with attending physician. Pt takes gabapentin.    0211: Urine specimen sent to lab per order.  
[facility]   Pharmacy Pharmacokinetic Monitoring Service - Vancomycin     Gwen Jorgensen is a 74 y.o. female starting on vancomycin therapy for Bloodstream infection Pharmacy consulted by Dr. Humera Lamb for monitoring and adjustment.    Target Concentration: Goal AUC/HANNAH 400-600 mg*hr/L    Additional Antimicrobials: none    Pertinent Laboratory Values:   Wt Readings from Last 1 Encounters:   12/27/24 93.7 kg (206 lb 9.6 oz)     Temp Readings from Last 1 Encounters:   12/29/24 98.9 °F (37.2 °C) (Oral)     Estimated Creatinine Clearance: 39 mL/min (based on SCr of 1.25 mg/dL).  Recent Labs     12/27/24  1714 12/27/24  1748   CREATININE  --  1.25   BUN  --  17   WBC 8.3  --        Plan:  Dosing recommendations based on Bayesian software  Start vancomycin 2000mg IV q24h  Follow with a maintenance dose Vancomycin IV 750mg q24h  Anticipated AUC of 470mg/L.hr and trough concentration of 14.4mg/L at steady state  Renal labs as indicated   Vancomycin concentration ordered for 12/30/24 @ 1200   Pharmacy will continue to monitor patient and adjust therapy as indicated    Thank you for the consult,  Chris Abraham RPH  12/29/2024 11:06 AM   
Verbal report given to Oma LARA from [facility]. Report included the following information Nurse Handoff Report, Adult Overview, Intake/Output, MAR, and Recent Results.     
assistance  Homemaking Responsibilities: No  Prior Level of Assist for Ambulation: Independent in home with wheelchair and able to pivot transfer (scoot transfers)  Prior Level of Assist for Transfers: Independent  Active : No  Patient's  Info: uses [employer]  Critical Behavior:   WFL    Strength:    Strength: Generally decreased, functional    Tone & Sensation:   Tone: Normal  Sensation: Intact    Range Of Motion:  AROM: Generally decreased, functional (Bilat BKA's)    Functional Mobility:  Bed Mobility:  Bed Mobility Training  Supine to Sit: Contact-guard assistance (using bed rail)  Sit to Supine: Contact-guard assistance (using bed rail)  Transfers:  Transfer Training  Transfer Training: No  Balance:   Balance  Sitting: Intact  Wheelchair Mobility:  Wheelchair Management  Wheelchair Management: No  Ambulation/Gait Training:  Gait  Gait Training: No  Therapeutic Exercises/Neuromuscular Re-education:   Seated: Push-ups 2x10, UE's: FF shoulder; Horizontal punch-outs 2x10; LAQ x10  Pain:  Pain level pre-treatment: 10/10 bilat LE's; 8/10 hands; 3/10 abdomen and HA    Pain level post-treatment: same   Pain Intervention(s): Medication (see MAR); Rest, Ice, Repositioning  Response to intervention: Nurse notified, See doc flow    Activity Tolerance:   Activity Tolerance: Patient tolerated evaluation without incident    Please refer to the flowsheet for vital signs taken during this treatment.    After treatment:   []         Patient left in no apparent distress sitting up in chair  [x]         Patient left in no apparent distress in bed  [x]         Call bell left within reach  [x]         Nursing notified  []         Caregiver present  []         Bed alarm activated  []         SCDs applied    COMMUNICATION/EDUCATION:   Patient Education  Education Given To: Patient  Education Provided: Role of Therapy;Plan of Care;Home Exercise Program;Energy Conservation;Mobility Training  Education Method: 
  Activity Tolerance: Patient Tolerated treatment well  Please refer to the flowsheet for vital signs taken during this treatment.    After treatment:   Safety Devices  Type of Devices: Left in bed, Call light within reach   COMMUNICATION/EDUCATION:   Patient Education  Education Given To: Patient  Education Provided: Role of Therapy;Transfer Training;Plan of Care;Home Exercise Program  Education Method: Verbal  Barriers to Learning: None  Education Outcome: Verbalized understanding;Demonstrated understanding    Thank you for this referral.  VALENTINO Small, OTR/L  Minutes: 29    Eval Complexity: Decision Making: Medium Complexity  
to present as per radiology interpretation in [facility]    Radiology reports reviewed:    XR KNEE LEFT (1-2 VIEWS)    Result Date: 12/29/2024  EXAM: XR KNEE LEFT (1-2 VIEWS) INDICATION: knee pain. COMPARISON: None. FINDINGS: 2 views of the left knee demonstrate postsurgical changes from below-knee amputation. No acute fracture or osseous erosion. No radiopaque foreign body.     Left below-knee amputation. No acute abnormality. Electronically signed by LORENZO ALONSO    XR KNEE RIGHT (1-2 VIEWS)    Result Date: 12/29/2024  EXAM: XR KNEE RIGHT (1-2 VIEWS) INDICATION: knee pain. COMPARISON: None. FINDINGS: Two views of the right knee demonstrate postsurgical changes from below-knee amputation. No evidence of acute fracture. No soft tissue gas.     Below-knee amputation. No acute fracture.. Electronically signed by LORENZO Schwartz MD  [facility])  Office #:     [phone]  [phone] Option #[phone]   Office Fax: [phone]      Note:  Medical notes are meant to be a communication between medical professionals.  Additionally, portion of this note were created using voice recognition function, syntax and phonetic over may have escaped proofreading.   If you are patient or family reviewing this note and have questions, please ask during rounds or bring it with you to your next doctors appointment.

## 2025-01-05 NOTE — PLAN OF CARE
Problem: Chronic Conditions and Co-morbidities  Goal: Patient's chronic conditions and co-morbidity symptoms are monitored and maintained or improved  1/4/2025 2208 by Vibha Tejada RN  Outcome: Progressing  1/4/2025 2207 by Vibha Tejada RN  Outcome: Progressing     Problem: Pain  Goal: Verbalizes/displays adequate comfort level or baseline comfort level  1/4/2025 2208 by Vibha Tejada RN  Outcome: Progressing  1/4/2025 0942 by Sandra Delacruz RN  Outcome: Progressing  Flowsheets (Taken 1/4/2025 0942)  Verbalizes/displays adequate comfort level or baseline comfort level:   Assess pain using appropriate pain scale   Administer analgesics based on type and severity of pain and evaluate response   Encourage patient to monitor pain and request assistance     Problem: Skin/Tissue Integrity  Goal: Absence of new skin breakdown  Description: 1.  Monitor for areas of redness and/or skin breakdown  2.  Assess vascular access sites hourly  3.  Every 4-6 hours minimum:  Change oxygen saturation probe site  4.  Every 4-6 hours:  If on nasal continuous positive airway pressure, respiratory therapy assess nares and determine need for appliance change or resting period.  1/4/2025 2208 by Vibha Tejada RN  Outcome: Progressing  1/4/2025 2207 by Vibha Tejada RN  Outcome: Progressing  1/4/2025 0942 by Sandra Delacruz RN  Outcome: Progressing     Problem: Safety - Adult  Goal: Free from fall injury  1/4/2025 2208 by Vibha Tejada RN  Outcome: Progressing  1/4/2025 2207 by Vibha Tejada RN  Outcome: Progressing  1/4/2025 0942 by Sandra Delacruz RN  Outcome: Progressing  Flowsheets (Taken 1/4/2025 0942)  Free From Fall Injury: Instruct family/caregiver on patient safety

## 2025-01-29 NOTE — PROGRESS NOTES
[facility]  (A Division of [facility])                                                           Date of Admission: (Not on file)       Reason for Consult FU: Antibiotic management for bacteremia  --MSSE       C/C: N/V/Dizziness on presenting to ED    Current Antimicrobials:    Prior Antimicrobials:    Cefazolin IV 12/30 to date   Ceftriaxone 12/27 to 12/30  Vancomycin 12/29 to 12/30   Allergy to antibiotics: NA       Assessment:     Staphylococcus epidermidis high-grade bacteremia 4/4 -MSSE by [facility], reported as multiple colony which correlates with likely procurement contamination     Transaminitis-could be from infection, improving-ALT//174 -total bili normal, biliary tree was unremarkable on CT she is postcholecystectomy    Diffusely healed skin rash-no active skin lesion  --Blood culture 12/27-4 out of 4 was coag negative staph, MSSA by [facility], final ID and sensitivity pending  --Blood culture 12/30-[facility]  --UA with WBC 11-20, culture in progress  --Respiratory  viral panel for adenovirus/Corona virus- COVID 19 and non COVID 19, human meta pneumovirus, rhino virus, Influenza A/B, parinflunenza 1-4, RSV negative. Bacterial PCR for bordetella, chlamydia pneumoniae and mycoplasma negative.    --Procalcitonin <0.05 12/30  --acute hepatitis panel for A/B/C-negative  --TTE- no mass or vegetation report    CXR-no acute finding   CT AP with IV-12/27:  IMPRESSION:  Fat-containing right lower quadrant abdominal hernia and incidentals  as above. No acute findings or other findings to correlate with abdominal pain or vomiting.    E.coli ( SS to cefazolin) and Aeroboccus bacteruria    Medical History:   Diagnosis Date    Diabetes (HCC)--uncontrolled, A1c 9.3%     High cholesterol     Hypertension    Morbid obesity-BMI 44  Bilateral BKA      Recommendation -- ID related:       Cont cefazolin 2 g every 8 hours- can DC today  TTE- no obvious IE-- multiple colony

## 2025-01-31 PROBLEM — N39.0 UTI (URINARY TRACT INFECTION): Status: RESOLVED | Noted: 2025-01-01 | Resolved: 2025-01-31

## 2025-02-08 ENCOUNTER — HOSPITAL ENCOUNTER (EMERGENCY)
Facility: HOSPITAL | Age: 75
Discharge: HOME | End: 2025-02-09
Attending: EMERGENCY MEDICINE
Payer: MEDICARE

## 2025-02-08 DIAGNOSIS — G89.29 OTHER CHRONIC PAIN: ICD-10-CM

## 2025-02-08 DIAGNOSIS — L98.421 SKIN ULCER OF SACRUM, LIMITED TO BREAKDOWN OF SKIN (HCC): Primary | ICD-10-CM

## 2025-02-08 DIAGNOSIS — R11.0 NAUSEA: ICD-10-CM

## 2025-02-08 PROCEDURE — 80048 BASIC METABOLIC PNL TOTAL CA: CPT

## 2025-02-08 PROCEDURE — 6360000002 HC RX W HCPCS: Performed by: EMERGENCY MEDICINE

## 2025-02-08 PROCEDURE — 6370000000 HC RX 637 (ALT 250 FOR IP): Performed by: EMERGENCY MEDICINE

## 2025-02-08 PROCEDURE — 85025 COMPLETE CBC W/AUTO DIFF WBC: CPT

## 2025-02-08 PROCEDURE — 96374 THER/PROPH/DIAG INJ IV PUSH: CPT

## 2025-02-08 PROCEDURE — 99284 EMERGENCY DEPT VISIT MOD MDM: CPT

## 2025-02-08 RX ORDER — ACETAMINOPHEN 500 MG
1000 TABLET ORAL
Status: COMPLETED | OUTPATIENT
Start: 2025-02-09 | End: 2025-02-08

## 2025-02-08 RX ORDER — KETOROLAC TROMETHAMINE 15 MG/ML
15 INJECTION, SOLUTION INTRAMUSCULAR; INTRAVENOUS
Status: COMPLETED | OUTPATIENT
Start: 2025-02-09 | End: 2025-02-08

## 2025-02-08 RX ADMIN — ACETAMINOPHEN 1000 MG: 500 TABLET ORAL at 23:58

## 2025-02-08 RX ADMIN — KETOROLAC TROMETHAMINE 15 MG: 15 INJECTION, SOLUTION INTRAMUSCULAR; INTRAVENOUS at 23:58

## 2025-02-08 ASSESSMENT — PAIN DESCRIPTION - LOCATION: LOCATION: GENERALIZED

## 2025-02-08 ASSESSMENT — PAIN SCALES - GENERAL: PAINLEVEL_OUTOF10: 10

## 2025-02-09 VITALS
BODY MASS INDEX: 30.31 KG/M2 | HEIGHT: 68 IN | DIASTOLIC BLOOD PRESSURE: 76 MMHG | TEMPERATURE: 97.7 F | HEART RATE: 96 BPM | WEIGHT: 200 LBS | RESPIRATION RATE: 20 BRPM | SYSTOLIC BLOOD PRESSURE: 137 MMHG | OXYGEN SATURATION: 96 %

## 2025-02-09 LAB
ANION GAP SERPL CALC-SCNC: 6 MMOL/L (ref 3–18)
BASOPHILS # BLD: 0.03 K/UL (ref 0–0.1)
BASOPHILS NFR BLD: 0.4 % (ref 0–2)
BUN SERPL-MCNC: 14 MG/DL (ref 7–18)
BUN/CREAT SERPL: 11 (ref 12–20)
CALCIUM SERPL-MCNC: 9.7 MG/DL (ref 8.5–10.1)
CHLORIDE SERPL-SCNC: 106 MMOL/L (ref 100–111)
CO2 SERPL-SCNC: 26 MMOL/L (ref 21–32)
CREAT SERPL-MCNC: 1.28 MG/DL (ref 0.6–1.3)
DIFFERENTIAL METHOD BLD: ABNORMAL
EOSINOPHIL # BLD: 0.11 K/UL (ref 0–0.4)
EOSINOPHIL NFR BLD: 1.3 % (ref 0–5)
ERYTHROCYTE [DISTWIDTH] IN BLOOD BY AUTOMATED COUNT: 17.3 % (ref 11.6–14.5)
GLUCOSE SERPL-MCNC: 171 MG/DL (ref 74–99)
HCT VFR BLD AUTO: 44.4 % (ref 35–45)
HGB BLD-MCNC: 13.7 G/DL (ref 12–16)
IMM GRANULOCYTES # BLD AUTO: 0.02 K/UL (ref 0–0.04)
IMM GRANULOCYTES NFR BLD AUTO: 0.2 % (ref 0–0.5)
LYMPHOCYTES # BLD: 3.06 K/UL (ref 0.9–3.3)
LYMPHOCYTES NFR BLD: 35.7 % (ref 21–52)
MCH RBC QN AUTO: 24.3 PG (ref 24–34)
MCHC RBC AUTO-ENTMCNC: 30.9 G/DL (ref 31–37)
MCV RBC AUTO: 78.9 FL (ref 78–100)
MONOCYTES # BLD: 0.56 K/UL (ref 0.05–1.2)
MONOCYTES NFR BLD: 6.5 % (ref 3–10)
NEUTS SEG # BLD: 4.78 K/UL (ref 1.8–8)
NEUTS SEG NFR BLD: 55.9 % (ref 40–73)
NRBC # BLD: 0 K/UL (ref 0–0.01)
NRBC BLD-RTO: 0 PER 100 WBC
PLATELET # BLD AUTO: 258 K/UL (ref 135–420)
PMV BLD AUTO: 10.2 FL (ref 9.2–11.8)
POTASSIUM SERPL-SCNC: 3.6 MMOL/L (ref 3.5–5.5)
RBC # BLD AUTO: 5.63 M/UL (ref 4.2–5.3)
SODIUM SERPL-SCNC: 138 MMOL/L (ref 136–145)
WBC # BLD AUTO: 8.6 K/UL (ref 4.6–13.2)

## 2025-02-09 PROCEDURE — 6370000000 HC RX 637 (ALT 250 FOR IP): Performed by: EMERGENCY MEDICINE

## 2025-02-09 RX ORDER — IBUPROFEN 800 MG/1
800 TABLET, FILM COATED ORAL EVERY 8 HOURS PRN
Qty: 30 TABLET | Refills: 0 | Status: SHIPPED | OUTPATIENT
Start: 2025-02-09

## 2025-02-09 RX ORDER — ACETAMINOPHEN 500 MG
1000 TABLET ORAL EVERY 6 HOURS PRN
Qty: 30 TABLET | Refills: 0 | Status: SHIPPED | OUTPATIENT
Start: 2025-02-09

## 2025-02-09 RX ORDER — PROMETHAZINE HYDROCHLORIDE 25 MG/1
25 TABLET ORAL
Status: COMPLETED | OUTPATIENT
Start: 2025-02-09 | End: 2025-02-09

## 2025-02-09 RX ORDER — OXYCODONE AND ACETAMINOPHEN 5; 325 MG/1; MG/1
1 TABLET ORAL
Status: COMPLETED | OUTPATIENT
Start: 2025-02-09 | End: 2025-02-09

## 2025-02-09 RX ADMIN — PROMETHAZINE HYDROCHLORIDE 25 MG: 25 TABLET ORAL at 03:26

## 2025-02-09 RX ADMIN — OXYCODONE HYDROCHLORIDE AND ACETAMINOPHEN 1 TABLET: 5; 325 TABLET ORAL at 01:43

## 2025-02-09 ASSESSMENT — PAIN SCALES - GENERAL: PAINLEVEL_OUTOF10: 7

## 2025-02-09 NOTE — ED TRIAGE NOTES
Pt BIBA c/o pain all over poiting to arms and legs. Recently discharged from [place] [facility]. Hx diabetes, bilateral amputee.

## 2025-02-09 NOTE — FLOWSHEET NOTE
02/09/25 0315   Vital Signs   /76   MAP (Calculated) 96   MAP (mmHg) 91     Patient resting on the stretcher at this time.     Chest rise and fall noted. VSS.     No further complaints at this time.     Patient instructed to utilize the \"call bell\" if any assistance is needed.

## 2025-02-09 NOTE — ED PROVIDER NOTES
[facility] EMERGENCY DEPT  EMERGENCY DEPARTMENT ENCOUNTER    Patient Name: Gwen Jorgensen  MRN: [id number]  YOB: 1950  Provider: Dell Vergara MD  PCP: No primary care provider on file.   Time/Date of evaluation: 11:25 PM EST on 2/8/25    History of Presenting Illness     Chief Complaint   Patient presents with    Back Pain    Nausea       History Provided by: EMS and Patient   History is limited by: Nothing    HISTORY (Narative):   Gwen Jorgensen is a 74 y.o. female with a PMHX of diabetes, hypertension, hyperlipidemia  who presents to the emergency department (room 8) by EMS C/O diffuse pains onset since discharge from [facility]. Associated sxs include sacral ulcer. Patient denies any other sxs or complaints.     Nursing Notes were all reviewed and agreed with or any disagreements were addressed in the HPI.    Past History     PAST MEDICAL HISTORY:  Past Medical History:   Diagnosis Date    Diabetes (HCC)     High cholesterol     Hypertension        PAST SURGICAL HISTORY:  Past Surgical History:   Procedure Laterality Date    CHOLECYSTECTOMY      COLONOSCOPY N/A 10/28/2019    COLONOSCOPY; POLYPECTOMY (CECUM/ASCENDING); CLIP PLACEMENT IN ASCENDING COLON performed by KRYSTAL Purvis MD at [facility] ENDOSCOPY    LOWER EXTREMITY AMPUTATION Bilateral        FAMILY HISTORY:  History reviewed. No pertinent family history.    SOCIAL HISTORY:  Social History     Tobacco Use    Smoking status: Former   Substance Use Topics    Alcohol use: No    Drug use: Never       MEDICATIONS:  Current Facility-Administered Medications   Medication Dose Route Frequency Provider Last Rate Last Admin    oxyCODONE-acetaminophen (PERCOCET) 5-325 MG per tablet 1 tablet  1 tablet Oral NOW Dell Vergara MD         Current Outpatient Medications   Medication Sig Dispense Refill    hydroCHLOROthiazide 12.5 MG tablet Take 1 tablet by mouth daily 30 tablet 0    gabapentin (NEURONTIN) 100 MG capsule Take 1 capsule by mouth daily for 1

## 2025-02-09 NOTE — ED NOTES
Transport here to pick patient up.  IV taken out     Armband removed and disposed of in shredder.     Patient has no further questions at this time.     Will discharge from system.

## 2025-02-09 NOTE — DISCHARGE INSTRUCTIONS
Thank you for choosing [facility]'s Emergency Department for your care. It is our privilege to provide excellent care for you in your time of need. In the next several days, you may receive a survey via mail or email about your experience with our team. We would appreciate you taking a few minutes to complete the survey, as we use this information to learn what we have done well and what we could be doing better. Thank you for trusting us with your care.    -----------------------------------------------------------------------------  Below you will find a list of your tests from today's visit.   Labs  Recent Results (from the past 12 hour(s))   CBC with Auto Differential    Collection Time: 02/08/25 11:53 PM   Result Value Ref Range    WBC 8.6 4.6 - 13.2 K/uL    RBC 5.63 (H) 4.20 - 5.30 M/uL    Hemoglobin 13.7 12.0 - 16.0 g/dL    Hematocrit 44.4 35.0 - 45.0 %    MCV 78.9 78.0 - 100.0 FL    MCH 24.3 24.0 - 34.0 PG    MCHC 30.9 (L) 31.0 - 37.0 g/dL    RDW 17.3 (H) 11.6 - 14.5 %    Platelets 258 135 - 420 K/uL    MPV 10.2 9.2 - 11.8 FL    Nucleated RBCs 0.0 0  WBC    nRBC 0.00 0.00 - 0.01 K/uL    Neutrophils % 55.9 40.0 - 73.0 %    Lymphocytes % 35.7 21.0 - 52.0 %    Monocytes % 6.5 3.0 - 10.0 %    Eosinophils % 1.3 0.0 - 5.0 %    Basophils % 0.4 0.0 - 2.0 %    Immature Granulocytes % 0.2 0.0 - 0.5 %    Neutrophils Absolute 4.78 1.80 - 8.00 K/UL    Lymphocytes Absolute 3.06 0.90 - 3.30 K/UL    Monocytes Absolute 0.56 0.05 - 1.20 K/UL    Eosinophils Absolute 0.11 0.00 - 0.40 K/UL    Basophils Absolute 0.03 0.00 - 0.10 K/UL    Immature Granulocytes Absolute 0.02 0.00 - 0.04 K/UL    Differential Type AUTOMATED     Basic Metabolic Panel    Collection Time: 02/08/25 11:53 PM   Result Value Ref Range    Sodium 138 136 - 145 mmol/L    Potassium 3.6 3.5 - 5.5 mmol/L    Chloride 106 100 - 111 mmol/L    CO2 26 21 - 32 mmol/L    Anion Gap 6 3.0 - 18 mmol/L    Glucose 171 (H) 74 - 99 mg/dL    BUN 14 7.0 -

## 2025-02-22 ENCOUNTER — APPOINTMENT (OUTPATIENT)
Facility: HOSPITAL | Age: 75
End: 2025-02-22
Payer: MEDICARE

## 2025-02-22 ENCOUNTER — HOSPITAL ENCOUNTER (EMERGENCY)
Facility: HOSPITAL | Age: 75
Discharge: HOME | End: 2025-02-22
Attending: STUDENT IN AN ORGANIZED HEALTH CARE EDUCATION/TRAINING PROGRAM
Payer: MEDICARE

## 2025-02-22 VITALS
WEIGHT: 205 LBS | OXYGEN SATURATION: 98 % | RESPIRATION RATE: 22 BRPM | BODY MASS INDEX: 31.17 KG/M2 | HEART RATE: 96 BPM | SYSTOLIC BLOOD PRESSURE: 146 MMHG | TEMPERATURE: 98.2 F | DIASTOLIC BLOOD PRESSURE: 53 MMHG

## 2025-02-22 DIAGNOSIS — R53.1 GENERAL WEAKNESS: ICD-10-CM

## 2025-02-22 DIAGNOSIS — R11.2 NAUSEA AND VOMITING, UNSPECIFIED VOMITING TYPE: Primary | ICD-10-CM

## 2025-02-22 DIAGNOSIS — L89.159 PRESSURE INJURY OF SKIN OF SACRAL REGION, UNSPECIFIED INJURY STAGE: ICD-10-CM

## 2025-02-22 LAB
ALBUMIN SERPL-MCNC: 3.3 G/DL (ref 3.4–5)
ALBUMIN/GLOB SERPL: 0.6 (ref 0.8–1.7)
ALP SERPL-CCNC: 210 U/L (ref 45–117)
ALT SERPL-CCNC: 58 U/L (ref 13–56)
ANION GAP SERPL CALC-SCNC: 3 MMOL/L (ref 3–18)
AST SERPL-CCNC: 35 U/L (ref 10–38)
BASOPHILS # BLD: 0.03 K/UL (ref 0–0.1)
BASOPHILS NFR BLD: 0.4 % (ref 0–2)
BILIRUB SERPL-MCNC: 0.3 MG/DL (ref 0.2–1)
BUN SERPL-MCNC: 16 MG/DL (ref 7–18)
BUN/CREAT SERPL: 12 (ref 12–20)
CALCIUM SERPL-MCNC: 9.5 MG/DL (ref 8.5–10.1)
CHLORIDE SERPL-SCNC: 103 MMOL/L (ref 100–111)
CO2 SERPL-SCNC: 31 MMOL/L (ref 21–32)
CREAT SERPL-MCNC: 1.34 MG/DL (ref 0.6–1.3)
DIFFERENTIAL METHOD BLD: ABNORMAL
EOSINOPHIL # BLD: 0.17 K/UL (ref 0–0.4)
EOSINOPHIL NFR BLD: 2.5 % (ref 0–5)
ERYTHROCYTE [DISTWIDTH] IN BLOOD BY AUTOMATED COUNT: 15.9 % (ref 11.6–14.5)
FLUAV RNA SPEC QL NAA+PROBE: NOT DETECTED
FLUBV RNA SPEC QL NAA+PROBE: NOT DETECTED
GLOBULIN SER CALC-MCNC: 5.2 G/DL (ref 2–4)
GLUCOSE SERPL-MCNC: 204 MG/DL (ref 74–99)
HCT VFR BLD AUTO: 44.9 % (ref 35–45)
HGB BLD-MCNC: 13.6 G/DL (ref 12–16)
IMM GRANULOCYTES # BLD AUTO: 0.02 K/UL (ref 0–0.04)
IMM GRANULOCYTES NFR BLD AUTO: 0.3 % (ref 0–0.5)
LYMPHOCYTES # BLD: 2.68 K/UL (ref 0.9–3.3)
LYMPHOCYTES NFR BLD: 40 % (ref 21–52)
MAGNESIUM SERPL-MCNC: 2 MG/DL (ref 1.6–2.6)
MCH RBC QN AUTO: 23.9 PG (ref 24–34)
MCHC RBC AUTO-ENTMCNC: 30.3 G/DL (ref 31–37)
MCV RBC AUTO: 78.9 FL (ref 78–100)
MONOCYTES # BLD: 0.47 K/UL (ref 0.05–1.2)
MONOCYTES NFR BLD: 7 % (ref 3–10)
NEUTS SEG # BLD: 3.33 K/UL (ref 1.8–8)
NEUTS SEG NFR BLD: 49.8 % (ref 40–73)
NRBC # BLD: 0 K/UL (ref 0–0.01)
NRBC BLD-RTO: 0 PER 100 WBC
PHOSPHATE SERPL-MCNC: 2.6 MG/DL (ref 2.5–4.9)
PLATELET # BLD AUTO: 242 K/UL (ref 135–420)
PMV BLD AUTO: 10.4 FL (ref 9.2–11.8)
POTASSIUM SERPL-SCNC: 3.2 MMOL/L (ref 3.5–5.5)
PROT SERPL-MCNC: 8.5 G/DL (ref 6.4–8.2)
RBC # BLD AUTO: 5.69 M/UL (ref 4.2–5.3)
SARS-COV-2 RNA RESP QL NAA+PROBE: NOT DETECTED
SODIUM SERPL-SCNC: 137 MMOL/L (ref 136–145)
SOURCE: NORMAL
TROPONIN I SERPL HS-MCNC: 14 NG/L (ref 0–54)
WBC # BLD AUTO: 6.7 K/UL (ref 4.6–13.2)

## 2025-02-22 PROCEDURE — 71045 X-RAY EXAM CHEST 1 VIEW: CPT

## 2025-02-22 PROCEDURE — 85025 COMPLETE CBC W/AUTO DIFF WBC: CPT

## 2025-02-22 PROCEDURE — 93005 ELECTROCARDIOGRAM TRACING: CPT | Performed by: STUDENT IN AN ORGANIZED HEALTH CARE EDUCATION/TRAINING PROGRAM

## 2025-02-22 PROCEDURE — 2580000003 HC RX 258: Performed by: STUDENT IN AN ORGANIZED HEALTH CARE EDUCATION/TRAINING PROGRAM

## 2025-02-22 PROCEDURE — 6370000000 HC RX 637 (ALT 250 FOR IP): Performed by: STUDENT IN AN ORGANIZED HEALTH CARE EDUCATION/TRAINING PROGRAM

## 2025-02-22 PROCEDURE — 96374 THER/PROPH/DIAG INJ IV PUSH: CPT

## 2025-02-22 PROCEDURE — 6360000004 HC RX CONTRAST MEDICATION: Performed by: STUDENT IN AN ORGANIZED HEALTH CARE EDUCATION/TRAINING PROGRAM

## 2025-02-22 PROCEDURE — 96375 TX/PRO/DX INJ NEW DRUG ADDON: CPT

## 2025-02-22 PROCEDURE — 6360000002 HC RX W HCPCS: Performed by: STUDENT IN AN ORGANIZED HEALTH CARE EDUCATION/TRAINING PROGRAM

## 2025-02-22 PROCEDURE — 74177 CT ABD & PELVIS W/CONTRAST: CPT

## 2025-02-22 PROCEDURE — 80053 COMPREHEN METABOLIC PANEL: CPT

## 2025-02-22 PROCEDURE — 83735 ASSAY OF MAGNESIUM: CPT

## 2025-02-22 PROCEDURE — 84100 ASSAY OF PHOSPHORUS: CPT

## 2025-02-22 PROCEDURE — 99285 EMERGENCY DEPT VISIT HI MDM: CPT

## 2025-02-22 PROCEDURE — 84484 ASSAY OF TROPONIN QUANT: CPT

## 2025-02-22 PROCEDURE — 96361 HYDRATE IV INFUSION ADD-ON: CPT

## 2025-02-22 PROCEDURE — 87636 SARSCOV2 & INF A&B AMP PRB: CPT

## 2025-02-22 PROCEDURE — 96376 TX/PRO/DX INJ SAME DRUG ADON: CPT

## 2025-02-22 RX ORDER — IOPAMIDOL 612 MG/ML
100 INJECTION, SOLUTION INTRAVASCULAR
Status: COMPLETED | OUTPATIENT
Start: 2025-02-22 | End: 2025-02-22

## 2025-02-22 RX ORDER — METOCLOPRAMIDE HYDROCHLORIDE 5 MG/ML
10 INJECTION INTRAMUSCULAR; INTRAVENOUS
Status: COMPLETED | OUTPATIENT
Start: 2025-02-22 | End: 2025-02-22

## 2025-02-22 RX ORDER — ONDANSETRON 4 MG/1
4 TABLET, ORALLY DISINTEGRATING ORAL 3 TIMES DAILY PRN
Qty: 21 TABLET | Refills: 0 | Status: SHIPPED | OUTPATIENT
Start: 2025-02-22

## 2025-02-22 RX ORDER — ACETAMINOPHEN 325 MG/1
650 TABLET ORAL
Status: COMPLETED | OUTPATIENT
Start: 2025-02-22 | End: 2025-02-22

## 2025-02-22 RX ORDER — KETOROLAC TROMETHAMINE 15 MG/ML
15 INJECTION, SOLUTION INTRAMUSCULAR; INTRAVENOUS
Status: COMPLETED | OUTPATIENT
Start: 2025-02-22 | End: 2025-02-22

## 2025-02-22 RX ORDER — 0.9 % SODIUM CHLORIDE 0.9 %
1000 INTRAVENOUS SOLUTION INTRAVENOUS ONCE
Status: COMPLETED | OUTPATIENT
Start: 2025-02-22 | End: 2025-02-22

## 2025-02-22 RX ADMIN — ACETAMINOPHEN 650 MG: 325 TABLET ORAL at 11:40

## 2025-02-22 RX ADMIN — IOPAMIDOL 100 ML: 612 INJECTION, SOLUTION INTRAVENOUS at 12:52

## 2025-02-22 RX ADMIN — KETOROLAC TROMETHAMINE 15 MG: 15 INJECTION, SOLUTION INTRAMUSCULAR; INTRAVENOUS at 11:40

## 2025-02-22 RX ADMIN — METOCLOPRAMIDE 10 MG: 5 INJECTION, SOLUTION INTRAMUSCULAR; INTRAVENOUS at 09:43

## 2025-02-22 RX ADMIN — METOCLOPRAMIDE 10 MG: 5 INJECTION, SOLUTION INTRAMUSCULAR; INTRAVENOUS at 15:52

## 2025-02-22 RX ADMIN — SODIUM CHLORIDE 1000 ML: 0.9 INJECTION, SOLUTION INTRAVENOUS at 09:44

## 2025-02-22 ASSESSMENT — PAIN DESCRIPTION - ORIENTATION: ORIENTATION: RIGHT;LEFT

## 2025-02-22 ASSESSMENT — PAIN DESCRIPTION - LOCATION: LOCATION: LEG

## 2025-02-22 ASSESSMENT — PAIN SCALES - GENERAL: PAINLEVEL_OUTOF10: 5

## 2025-02-22 ASSESSMENT — PAIN - FUNCTIONAL ASSESSMENT: PAIN_FUNCTIONAL_ASSESSMENT: 0-10

## 2025-02-22 NOTE — ED NOTES
Report given to Bogdan PATEL RN.      Patient information discussed and reviewed per facility protocol.      Outstanding orders reviewed (if applicable).     No applicable questions at this time.      Will sign off from patient.

## 2025-02-22 NOTE — PROGRESS NOTES
1830: Care manager called, left message on voice mail for daughter Mary Daniel [phone] who lives in [place]; also was able to speak to daughter who lives in [place], Vicki Pizano [phone]; Per Ms. Pizano, Mary's daughter does check on patient regularly and assist with medications; they are very much aware that home has many items which need to be cleaned out and that patient refuses offers; was not aware of riojas infestation that was reported by paramedics.  Per Ms. Pizano, is intending to come to see her mother in March.  Care manager made Ms. Pizano aware of report of hoarding situation and bug infestation and that this care manager would be contacting adult protective services.  APS  Luis Soto took report from care manager - APS # is [id number].

## 2025-02-22 NOTE — DISCHARGE INSTRUCTIONS
You were evaluated for weakness , nausea, and vomiting, .  Based on your work-up it was deemed that she was stable for discharge.  Please  your medication of Zofran which was prescribed to you.  Please follow-up with your primary care physician if you have any further concerns and go over your work-up.  If you experience any chest pain, shortness of breath, worsening abdominal pain, vomiting blood, worsening headache, seizures, or any worsening of your symptoms please return to the emergency department immediately.  If you have any pending results or any further questions please contact the emergency department at [phone]

## 2025-02-22 NOTE — ED NOTES
Verbal shift change report given to AISHA Mcfadden (oncoming nurse) by Bogdan Ramirez RN (offgoing nurse). Report included the following information Nurse Handoff Report, Index, ED Encounter Summary, ED SBAR, Adult Overview, Recent Results, and Med Rec Status.

## 2025-02-22 NOTE — ED PROVIDER NOTES
EMERGENCY DEPARTMENT HISTORY AND PHYSICAL EXAM    6:03 PM      Date: 2/22/2025  Patient Name: Gwen Jorgensen    History of Presenting Illness     Chief Complaint   Patient presents with    Vomiting    Nausea    Fatigue       History From: Patient and EMS    Gwen Jorgensen is a 74 y.o. female   HPI  74-year-old female history of hypertension, diabetes, hyperlipidemia, status post BKA who presents with generalized weakness, nausea.  Patient said that she has been having worsening symptoms since yesterday.  Unable to tolerate p.o., patient is fatigued.  Denies any change in meds, sick contacts, or any other changes.  No aggravating or alleviating factors.  When assessing ROS she denies any fevers, chest pain, shortness of breath, abdominal pain, change in bowel movements, or any other changes.    Nursing Notes were all reviewed and agreed with or any disagreements were addressed in the HPI.    PCP: No primary care provider on file.    No current facility-administered medications for this encounter.     Current Outpatient Medications   Medication Sig Dispense Refill    ondansetron (ZOFRAN-ODT) 4 MG disintegrating tablet Take 1 tablet by mouth 3 times daily as needed for Nausea or Vomiting 21 tablet 0    acetaminophen (TYLENOL) 500 MG tablet Take 2 tablets by mouth every 6 hours as needed for Pain 30 tablet 0    ibuprofen (ADVIL;MOTRIN) 800 MG tablet Take 1 tablet by mouth every 8 hours as needed for Pain 30 tablet 0    hydroCHLOROthiazide 12.5 MG tablet Take 1 tablet by mouth daily 30 tablet 0    gabapentin (NEURONTIN) 100 MG capsule Take 1 capsule by mouth daily for 1 day. Max Daily Amount: 100 mg 1 capsule 0    insulin lispro (HUMALOG,ADMELOG) 100 UNIT/ML SOLN injection vial Inject 0-8 Units into the skin 4 times daily (before meals and nightly) **Medium Dose Corrective Algorithm**  Glucose: Dose:    No Insulin  180-249 2 Units  250-299 4 Units  300-349 6 Units  Over 349 8 Units and notify physician    Administer as  Signs-Reviewed the patient's vital signs.      EKG: ed course       ED Course: Progress Notes, Reevaluation, and Consults:    Provider Notes (Medical Decision Making):     MDM  74-year-old female who presents with generalized weakness, nausea, vomiting.  Will consider metabolic derangement versus dehydration.  Low suspicion for ACS as patient does not complain of a chest pain at this time.  Low suspicion for arrhythmia.  Will obtain labs and imaging.  Patient labs show mild hypokalemia 3.2.  Otherwise no signs of any significant metabolic lab abnormalities.  Patient given 1 L bolus fluid resuscitation.  Patient given antiemetic medication.  Patient troponin within normal limits.  Low suspicion for ACS at this time.  Patient chest x-ray shows no signs of acute cardiopulmonary changes.  Per nursing staff concerns for infection of the sacrum.  Patient CT abdomen pelvis shows sacral decubitus.  No signs of sacral osteomyelitis.  Discussed workup with patient.  Case management was consulted.  APS was consulted.  Patient will obtain [facility] recommendations and help in the outpatient setting.  Patient was treated appropriately and discharged.    ED Course as of 02/22/25 1803   Sat Feb 22, 2025   0910 EKG: Sinus rhythm, heart rate 98, [place] QTc within normals, left axis deviation, no signs of acute ischemic changes. [KS]      ED Course User Index  [KS] Jimi Patterson MD       Patient was given the following medications:  Medications   sodium chloride 0.9 % bolus 1,000 mL (0 mLs IntraVENous Stopped 2/22/25 1045)   metoclopramide (REGLAN) injection 10 mg (10 mg IntraVENous Given 2/22/25 0943)   ketorolac (TORADOL) injection 15 mg (15 mg IntraVENous Given 2/22/25 1140)   acetaminophen (TYLENOL) tablet 650 mg (650 mg Oral Given 2/22/25 1140)   iopamidol (ISOVUE-300) 61 % injection 100 mL (100 mLs IntraVENous Given 2/22/25 1252)   metoclopramide (REGLAN) injection 10 mg (10 mg IntraVENous Given 2/22/25 1552)       CONSULTS: (Who

## 2025-02-22 NOTE — ED TRIAGE NOTES
Pt BIBA NN4 from home c/o weakness and n/v x24 hrs. Hx HTN, T2D. /107 en route, bilateral BKA. Pt lives alone and does get visits from para medicine. Per EMS, pt lives in hoarder situation and home is possibly unsuitable for pt to return.

## 2025-02-22 NOTE — ED NOTES
Patient given discharge papers. Patient understanding of discharge. Patient left via stretcher with transport.

## 2025-02-24 LAB
EKG ATRIAL RATE: 98 BPM
EKG DIAGNOSIS: NORMAL
EKG P AXIS: 46 DEGREES
EKG P-R INTERVAL: 198 MS
EKG Q-T INTERVAL: 364 MS
EKG QRS DURATION: 98 MS
EKG QTC CALCULATION (BAZETT): 464 MS
EKG R AXIS: -68 DEGREES
EKG T AXIS: 56 DEGREES
EKG VENTRICULAR RATE: 98 BPM

## 2025-02-24 PROCEDURE — 93010 ELECTROCARDIOGRAM REPORT: CPT | Performed by: INTERNAL MEDICINE

## 2025-02-26 ENCOUNTER — HOSPITAL ENCOUNTER (OUTPATIENT)
Facility: HOSPITAL | Age: 75
Discharge: HOME | End: 2025-01-29
Attending: INTERNAL MEDICINE | Admitting: INTERNAL MEDICINE

## (undated) DEVICE — ENDO CARRY-ON PROCEDURE KIT INCLUDES ENZYMATIC SPONGE, GAUZE, BIOHAZARD LABEL, TRAY, LUBRICANT, DIRTY SCOPE LABEL, WATER LABEL, TRAY, DRAWSTRING PAD, AND DEFENDO 4-PIECE KIT.: Brand: ENDO CARRY-ON PROCEDURE KIT

## (undated) DEVICE — REM POLYHESIVE ADULT PATIENT RETURN ELECTRODE: Brand: VALLEYLAB

## (undated) DEVICE — SYR 5ML 1/5 GRAD LL NSAF LF --

## (undated) DEVICE — CATHETER ELECHEMSTAS 7FR L300CM CHN 2.8MM STD CONN DISP G

## (undated) DEVICE — WRISTBAND ID AD W2.5XL9.5CM RED VYN ADH CLSR UNI-PRINT

## (undated) DEVICE — NDL PRT INJ NSAF BLNT 18GX1.5 --

## (undated) DEVICE — TRNQT TEXT 1X18IN BLU LF DISP -- CONVERT TO ITEM 362165

## (undated) DEVICE — GARMENT,MEDLINE,DVT,INT,CALF,MED, GEN2: Brand: MEDLINE

## (undated) DEVICE — SNARE POLYP SM W13MMXL240CM SHTH DIA2.4MM OVL FLX DISP

## (undated) DEVICE — TRAP SPEC COLL POLYP POLYSTYR --

## (undated) DEVICE — SOLUTION IV 500ML 0.9% SOD CHL FLX CONT

## (undated) DEVICE — CATH IV SAFE STR 22GX1IN BLU -- PROTECTIV PLUS

## (undated) DEVICE — KENDALL RADIOLUCENT FOAM MONITORING ELECTRODE RECTANGULAR SHAPE: Brand: KENDALL

## (undated) DEVICE — CLIP HEMO ENDOSCP 235CM BX/10 -- RESOLUTION 360

## (undated) DEVICE — SPONGE GZ W4XL4IN RAYON POLY 4 PLY NONWOVEN FASTER WICKING

## (undated) DEVICE — SINGLE PORT MANIFOLD: Brand: NEPTUNE 2

## (undated) DEVICE — SET ADMIN 16ML TBNG L100IN 2 Y INJ SITE IV PIGGY BK DISP

## (undated) DEVICE — NDL FLTR TIP 5 MIC 18GX1.5IN --

## (undated) DEVICE — MEDI-VAC NON-CONDUCTIVE SUCTION TUBING: Brand: CARDINAL HEALTH

## (undated) DEVICE — CANNULA CUSH AD W/ 14FT TBG

## (undated) DEVICE — CATH SUC CTRL PRT TRIFLO 14FR --

## (undated) DEVICE — MAJ-1414 SINGLE USE ADPATER BIOPSY VALV: Brand: SINGLE USE ADAPTOR BIOPSY VALVE

## (undated) DEVICE — SYR 3ML LL TIP 1/10ML GRAD --

## (undated) DEVICE — MOUTHPIECE ENDOSCP 20X27MM --

## (undated) DEVICE — SPONGE GZ W4XL4IN COT 12 PLY TYP VII WVN C FLD DSGN

## (undated) DEVICE — SYRINGE 50ML E/T